# Patient Record
Sex: MALE | Race: WHITE | NOT HISPANIC OR LATINO
[De-identification: names, ages, dates, MRNs, and addresses within clinical notes are randomized per-mention and may not be internally consistent; named-entity substitution may affect disease eponyms.]

---

## 2017-02-07 ENCOUNTER — APPOINTMENT (OUTPATIENT)
Dept: UROLOGY | Facility: CLINIC | Age: 62
End: 2017-02-07

## 2017-02-07 ENCOUNTER — OUTPATIENT (OUTPATIENT)
Dept: OUTPATIENT SERVICES | Facility: HOSPITAL | Age: 62
LOS: 1 days | End: 2017-02-07
Payer: COMMERCIAL

## 2017-02-07 VITALS
BODY MASS INDEX: 25.11 KG/M2 | WEIGHT: 160 LBS | DIASTOLIC BLOOD PRESSURE: 78 MMHG | HEIGHT: 67 IN | HEART RATE: 83 BPM | SYSTOLIC BLOOD PRESSURE: 128 MMHG | RESPIRATION RATE: 17 BRPM | TEMPERATURE: 98 F

## 2017-02-07 DIAGNOSIS — Z93.6 OTHER ARTIFICIAL OPENINGS OF URINARY TRACT STATUS: Chronic | ICD-10-CM

## 2017-02-07 DIAGNOSIS — R35.0 FREQUENCY OF MICTURITION: ICD-10-CM

## 2017-02-07 DIAGNOSIS — Z98.89 OTHER SPECIFIED POSTPROCEDURAL STATES: Chronic | ICD-10-CM

## 2017-02-07 LAB
PSA FREE FLD-MCNC: 14.6 %
PSA FREE SERPL-MCNC: 0.91 NG/ML
PSA SERPL-MCNC: 6.25 NG/ML

## 2017-02-07 PROCEDURE — 76775 US EXAM ABDO BACK WALL LIM: CPT

## 2017-02-16 DIAGNOSIS — R97.20 ELEVATED PROSTATE SPECIFIC ANTIGEN [PSA]: ICD-10-CM

## 2017-02-16 DIAGNOSIS — N20.0 CALCULUS OF KIDNEY: ICD-10-CM

## 2017-03-30 LAB
PSA FREE FLD-MCNC: 14.6 %
PSA FREE SERPL-MCNC: 0.52 NG/ML
PSA SERPL-MCNC: 3.57 NG/ML

## 2017-12-04 ENCOUNTER — TRANSCRIPTION ENCOUNTER (OUTPATIENT)
Age: 62
End: 2017-12-04

## 2018-02-01 ENCOUNTER — OUTPATIENT (OUTPATIENT)
Dept: OUTPATIENT SERVICES | Facility: HOSPITAL | Age: 63
LOS: 1 days | End: 2018-02-01
Payer: COMMERCIAL

## 2018-02-01 ENCOUNTER — APPOINTMENT (OUTPATIENT)
Dept: UROLOGY | Facility: CLINIC | Age: 63
End: 2018-02-01
Payer: COMMERCIAL

## 2018-02-01 VITALS
BODY MASS INDEX: 26.68 KG/M2 | DIASTOLIC BLOOD PRESSURE: 81 MMHG | TEMPERATURE: 98 F | RESPIRATION RATE: 17 BRPM | HEIGHT: 67 IN | HEART RATE: 83 BPM | SYSTOLIC BLOOD PRESSURE: 133 MMHG | WEIGHT: 170 LBS

## 2018-02-01 DIAGNOSIS — N28.89 OTHER SPECIFIED DISORDERS OF KIDNEY AND URETER: ICD-10-CM

## 2018-02-01 DIAGNOSIS — R35.0 FREQUENCY OF MICTURITION: ICD-10-CM

## 2018-02-01 DIAGNOSIS — Z98.89 OTHER SPECIFIED POSTPROCEDURAL STATES: Chronic | ICD-10-CM

## 2018-02-01 DIAGNOSIS — Z93.6 OTHER ARTIFICIAL OPENINGS OF URINARY TRACT STATUS: Chronic | ICD-10-CM

## 2018-02-01 LAB
PSA FREE FLD-MCNC: 17
PSA FREE SERPL-MCNC: 0.73 NG/ML
PSA SERPL-MCNC: 4.3 NG/ML

## 2018-02-01 PROCEDURE — 76775 US EXAM ABDO BACK WALL LIM: CPT | Mod: 26

## 2018-02-01 PROCEDURE — 99213 OFFICE O/P EST LOW 20 MIN: CPT | Mod: 25

## 2018-02-01 PROCEDURE — 76775 US EXAM ABDO BACK WALL LIM: CPT

## 2018-02-14 DIAGNOSIS — N28.1 CYST OF KIDNEY, ACQUIRED: ICD-10-CM

## 2018-02-14 DIAGNOSIS — R97.20 ELEVATED PROSTATE SPECIFIC ANTIGEN [PSA]: ICD-10-CM

## 2018-02-14 DIAGNOSIS — N20.0 CALCULUS OF KIDNEY: ICD-10-CM

## 2018-08-30 ENCOUNTER — APPOINTMENT (OUTPATIENT)
Dept: UROLOGY | Facility: CLINIC | Age: 63
End: 2018-08-30
Payer: COMMERCIAL

## 2018-08-30 ENCOUNTER — OUTPATIENT (OUTPATIENT)
Dept: OUTPATIENT SERVICES | Facility: HOSPITAL | Age: 63
LOS: 1 days | End: 2018-08-30
Payer: COMMERCIAL

## 2018-08-30 VITALS
RESPIRATION RATE: 17 BRPM | BODY MASS INDEX: 27 KG/M2 | DIASTOLIC BLOOD PRESSURE: 84 MMHG | TEMPERATURE: 97.9 F | WEIGHT: 172 LBS | SYSTOLIC BLOOD PRESSURE: 123 MMHG | HEART RATE: 89 BPM | HEIGHT: 67 IN

## 2018-08-30 DIAGNOSIS — Z98.89 OTHER SPECIFIED POSTPROCEDURAL STATES: Chronic | ICD-10-CM

## 2018-08-30 DIAGNOSIS — Z93.6 OTHER ARTIFICIAL OPENINGS OF URINARY TRACT STATUS: Chronic | ICD-10-CM

## 2018-08-30 DIAGNOSIS — R35.0 FREQUENCY OF MICTURITION: ICD-10-CM

## 2018-08-30 LAB — PSA SERPL-MCNC: 2.5

## 2018-08-30 PROCEDURE — 76775 US EXAM ABDO BACK WALL LIM: CPT

## 2018-08-30 PROCEDURE — 99213 OFFICE O/P EST LOW 20 MIN: CPT | Mod: 25

## 2018-08-30 PROCEDURE — 76775 US EXAM ABDO BACK WALL LIM: CPT | Mod: 26

## 2018-09-12 DIAGNOSIS — R97.20 ELEVATED PROSTATE SPECIFIC ANTIGEN [PSA]: ICD-10-CM

## 2018-09-12 DIAGNOSIS — N20.0 CALCULUS OF KIDNEY: ICD-10-CM

## 2018-10-04 ENCOUNTER — APPOINTMENT (OUTPATIENT)
Dept: UROLOGY | Facility: CLINIC | Age: 63
End: 2018-10-04

## 2019-05-16 ENCOUNTER — OUTPATIENT (OUTPATIENT)
Dept: OUTPATIENT SERVICES | Facility: HOSPITAL | Age: 64
LOS: 1 days | End: 2019-05-16
Payer: COMMERCIAL

## 2019-05-16 ENCOUNTER — APPOINTMENT (OUTPATIENT)
Dept: UROLOGY | Facility: CLINIC | Age: 64
End: 2019-05-16
Payer: COMMERCIAL

## 2019-05-16 DIAGNOSIS — R97.20 ELEVATED PROSTATE SPECIFIC ANTIGEN [PSA]: ICD-10-CM

## 2019-05-16 DIAGNOSIS — R35.0 FREQUENCY OF MICTURITION: ICD-10-CM

## 2019-05-16 DIAGNOSIS — N20.0 CALCULUS OF KIDNEY: ICD-10-CM

## 2019-05-16 DIAGNOSIS — Z93.6 OTHER ARTIFICIAL OPENINGS OF URINARY TRACT STATUS: Chronic | ICD-10-CM

## 2019-05-16 DIAGNOSIS — Z98.89 OTHER SPECIFIED POSTPROCEDURAL STATES: Chronic | ICD-10-CM

## 2019-05-16 LAB — PSA SERPL-MCNC: 4.24 NG/ML

## 2019-05-16 PROCEDURE — 99213 OFFICE O/P EST LOW 20 MIN: CPT | Mod: 25

## 2019-05-16 PROCEDURE — 76775 US EXAM ABDO BACK WALL LIM: CPT | Mod: 26

## 2019-05-16 PROCEDURE — 76775 US EXAM ABDO BACK WALL LIM: CPT

## 2019-06-03 NOTE — ASSESSMENT
[FreeTextEntry1] : Renal US: no stones, no hydronephrosis. Stable complex renal cyst.\par PSA stable.\par \par Has mild BPH symptoms but not bothersome enough for medication.\par Follow up in one year for repeat Renal US.

## 2019-06-03 NOTE — HISTORY OF PRESENT ILLNESS
[FreeTextEntry1] : Here for 8 month follow up.\par Renal US today.\par \par No new urinary complaints.\par No abdominal or flank pain.\par \par Med/allergy reviewed.

## 2019-06-03 NOTE — PHYSICAL EXAM
[General Appearance - Well Nourished] : well nourished [Normal Appearance] : normal appearance [General Appearance - Well Developed] : well developed [Well Groomed] : well groomed [Abdomen Soft] : soft [General Appearance - In No Acute Distress] : no acute distress [Abdomen Tenderness] : non-tender [Urinary Bladder Findings] : the bladder was normal on palpation [Costovertebral Angle Tenderness] : no ~M costovertebral angle tenderness

## 2019-08-18 ENCOUNTER — EMERGENCY (EMERGENCY)
Facility: HOSPITAL | Age: 64
LOS: 0 days | Discharge: HOME | End: 2019-08-18
Attending: EMERGENCY MEDICINE
Payer: COMMERCIAL

## 2019-08-18 VITALS
SYSTOLIC BLOOD PRESSURE: 114 MMHG | DIASTOLIC BLOOD PRESSURE: 74 MMHG | RESPIRATION RATE: 18 BRPM | OXYGEN SATURATION: 100 % | TEMPERATURE: 98 F | HEART RATE: 94 BPM

## 2019-08-18 VITALS — WEIGHT: 173.94 LBS | HEIGHT: 67 IN

## 2019-08-18 DIAGNOSIS — Z87.442 PERSONAL HISTORY OF URINARY CALCULI: ICD-10-CM

## 2019-08-18 DIAGNOSIS — Z93.6 OTHER ARTIFICIAL OPENINGS OF URINARY TRACT STATUS: Chronic | ICD-10-CM
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
## 2019-08-18 DIAGNOSIS — Z98.89 OTHER SPECIFIED POSTPROCEDURAL STATES: Chronic | ICD-10-CM

## 2019-08-18 DIAGNOSIS — I10 ESSENTIAL (PRIMARY) HYPERTENSION: ICD-10-CM

## 2019-08-18 DIAGNOSIS — S61.215A LACERATION WITHOUT FOREIGN BODY OF LEFT RING FINGER WITHOUT DAMAGE TO NAIL, INITIAL ENCOUNTER: ICD-10-CM

## 2019-08-18 DIAGNOSIS — Z23 ENCOUNTER FOR IMMUNIZATION: ICD-10-CM
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
## 2019-08-18 PROCEDURE — 73140 X-RAY EXAM OF FINGER(S): CPT | Mod: 26,LT

## 2019-08-18 PROCEDURE — 12001 RPR S/N/AX/GEN/TRNK 2.5CM/<: CPT

## 2019-08-18 PROCEDURE — 90471 IMMUNIZATION ADMIN: CPT

## 2019-08-18 PROCEDURE — 99284 EMERGENCY DEPT VISIT MOD MDM: CPT

## 2019-08-18 PROCEDURE — 99283 EMERGENCY DEPT VISIT LOW MDM: CPT | Mod: 25

## 2019-08-18 PROCEDURE — 90715 TDAP VACCINE 7 YRS/> IM: CPT

## 2019-08-18 PROCEDURE — 73140 X-RAY EXAM OF FINGER(S): CPT | Mod: LT

## 2019-08-18 RX ORDER — TETANUS TOXOID, REDUCED DIPHTHERIA TOXOID AND ACELLULAR PERTUSSIS VACCINE, ADSORBED 5; 2.5; 8; 8; 2.5 [IU]/.5ML; [IU]/.5ML; UG/.5ML; UG/.5ML; UG/.5ML
0.5 SUSPENSION INTRAMUSCULAR ONCE
Refills: 0 | Status: COMPLETED | OUTPATIENT
Start: 2019-08-18 | End: 2019-08-18

## 2019-08-18 RX ADMIN — TETANUS TOXOID, REDUCED DIPHTHERIA TOXOID AND ACELLULAR PERTUSSIS VACCINE, ADSORBED 0.5 MILLILITER(S): 5; 2.5; 8; 8; 2.5 SUSPENSION INTRAMUSCULAR at 19:50

## 2019-08-18 NOTE — ED STATDOCS - PROGRESS NOTE DETAILS
Patient seen and evaluated with ED attending at intake.  dermabond used to repair superficial wound.  He will follow up with PMD -Kee Lo PA-C

## 2019-08-18 NOTE — ED STATDOCS - NSFOLLOWUPINSTRUCTIONS_ED_ALL_ED_FT
Tissue Adhesive Wound Care  Some cuts and wounds can be closed with skin glue (tissue adhesive). Skin glue holds the skin together and helps your wound heal faster. Skin glue goes away on its own as your wound gets better.    Follow these instructions at home:  Wound care     Image   Showers are allowed 24 hours after treatment. Do not soak the wound in water. Do not take baths, swim, or use hot tubs. Do not use soaps or creams on your wound.  If a bandage (dressing) was put on the wound:    Wash your hands with soap and water before you change your bandage.  Change the bandage as often as told by your doctor.  Leave skin glue in place. It will fall off on its own after 7–10 days.  Keep the bandage dry.    Do not scratch, rub, or pick at the skin glue.  Do not put tape over the skin glue. The skin glue could come off when you take the tape off.  Protect the wound from another injury.  Protect the wound from sun and tanning beds.  General instructions     Take over-the-counter and prescription medicines only as told by your doctor.  Keep all follow-up visits as told by your doctor. This is important.  Get help right away if:  Your wound is red, puffy (swollen), hot, or tender.  You get a rash after the glue is put on.  You have more pain in the wound.  You have a red streak going away from the wound.  You have yellowish-white fluid (pus) coming from the wound.  You have more bleeding.  You have a fever.  You have chills and you start to shake.  You notice a bad smell coming from the wound.  Your wound or skin glue breaks open.  This information is not intended to replace advice given to you by your health care provider. Make sure you discuss any questions you have with your health care provider.

## 2019-08-18 NOTE — ED STATDOCS - OBJECTIVE STATEMENT
65 y/o male with PMHx of HTN presents to the ED c/o L 4th digit laceration with assoc. pain sustained 1hr PTA. Bleeding controlled PTA. Pt states that he accidentally cut finger when using knife. Tetanus not UTD. 63 y/o male with PMHx of HTN presents to the ED c/o L 4th digit laceration with assoc. pain sustained 1hr PTA. Bleeding controlled PTA. Pt states that he accidentally cut finger when using knife. Tetanus not UTD. No other acute complaints in ED at this time.

## 2020-02-09 ENCOUNTER — LABORATORY RESULT (OUTPATIENT)
Age: 65
End: 2020-02-09

## 2020-02-13 ENCOUNTER — OUTPATIENT (OUTPATIENT)
Dept: OUTPATIENT SERVICES | Facility: HOSPITAL | Age: 65
LOS: 1 days | End: 2020-02-13
Payer: COMMERCIAL

## 2020-02-13 ENCOUNTER — APPOINTMENT (OUTPATIENT)
Dept: UROLOGY | Facility: CLINIC | Age: 65
End: 2020-02-13
Payer: COMMERCIAL

## 2020-02-13 DIAGNOSIS — Z98.89 OTHER SPECIFIED POSTPROCEDURAL STATES: Chronic | ICD-10-CM

## 2020-02-13 DIAGNOSIS — Z93.6 OTHER ARTIFICIAL OPENINGS OF URINARY TRACT STATUS: Chronic | ICD-10-CM

## 2020-02-13 DIAGNOSIS — R35.0 FREQUENCY OF MICTURITION: ICD-10-CM

## 2020-02-13 PROCEDURE — 99213 OFFICE O/P EST LOW 20 MIN: CPT | Mod: 25

## 2020-02-13 PROCEDURE — 76775 US EXAM ABDO BACK WALL LIM: CPT

## 2020-02-13 PROCEDURE — 76775 US EXAM ABDO BACK WALL LIM: CPT | Mod: 26

## 2020-02-13 NOTE — HISTORY OF PRESENT ILLNESS
[FreeTextEntry1] : Here for follow up visit.\par BPH: stream is weaker but no significant frequency, urgency. Nocturia 2x.  Symptoms not bothersome.\par \par No hematuria, dysuria or incontinence.\par \par No renal colic or stone passage.\par \par Office US today:  complex cyst stable in size, non vascular.  Right kidney hyperechoic lesion consistent with AML increased in size from 1.8 to 2.6 cm.

## 2020-02-13 NOTE — ASSESSMENT
[FreeTextEntry1] : BPH: no medication at this time.\par Renal AML:  follow up in 6 months for repeat US.\par Labs reviewed.

## 2020-02-13 NOTE — PHYSICAL EXAM
[General Appearance - Well Developed] : well developed [General Appearance - Well Nourished] : well nourished [Normal Appearance] : normal appearance [Abdomen Soft] : soft [Well Groomed] : well groomed [General Appearance - In No Acute Distress] : no acute distress [Abdomen Tenderness] : non-tender [Costovertebral Angle Tenderness] : no ~M costovertebral angle tenderness [Urinary Bladder Findings] : the bladder was normal on palpation

## 2020-02-24 DIAGNOSIS — N20.0 CALCULUS OF KIDNEY: ICD-10-CM

## 2020-02-24 DIAGNOSIS — R97.20 ELEVATED PROSTATE SPECIFIC ANTIGEN [PSA]: ICD-10-CM

## 2020-09-08 ENCOUNTER — APPOINTMENT (OUTPATIENT)
Dept: UROLOGY | Facility: CLINIC | Age: 65
End: 2020-09-08
Payer: COMMERCIAL

## 2020-09-08 ENCOUNTER — OUTPATIENT (OUTPATIENT)
Dept: OUTPATIENT SERVICES | Facility: HOSPITAL | Age: 65
LOS: 1 days | End: 2020-09-08
Payer: COMMERCIAL

## 2020-09-08 VITALS
BODY MASS INDEX: 27 KG/M2 | SYSTOLIC BLOOD PRESSURE: 114 MMHG | WEIGHT: 172 LBS | DIASTOLIC BLOOD PRESSURE: 73 MMHG | RESPIRATION RATE: 16 BRPM | HEART RATE: 69 BPM | HEIGHT: 67 IN

## 2020-09-08 VITALS — TEMPERATURE: 98 F

## 2020-09-08 DIAGNOSIS — Z93.6 OTHER ARTIFICIAL OPENINGS OF URINARY TRACT STATUS: Chronic | ICD-10-CM

## 2020-09-08 DIAGNOSIS — Z98.89 OTHER SPECIFIED POSTPROCEDURAL STATES: Chronic | ICD-10-CM

## 2020-09-08 DIAGNOSIS — R35.0 FREQUENCY OF MICTURITION: ICD-10-CM

## 2020-09-08 LAB
PSA FREE FLD-MCNC: 14 %
PSA FREE SERPL-MCNC: 0.55 NG/ML
PSA SERPL-MCNC: 3.84 NG/ML

## 2020-09-08 PROCEDURE — 76775 US EXAM ABDO BACK WALL LIM: CPT

## 2020-09-08 PROCEDURE — 99213 OFFICE O/P EST LOW 20 MIN: CPT | Mod: 25

## 2020-09-08 PROCEDURE — 76775 US EXAM ABDO BACK WALL LIM: CPT | Mod: 26

## 2020-09-10 ENCOUNTER — APPOINTMENT (OUTPATIENT)
Dept: UROLOGY | Facility: CLINIC | Age: 65
End: 2020-09-10

## 2020-09-11 NOTE — HISTORY OF PRESENT ILLNESS
[FreeTextEntry1] : Here for follow up visit.\par BPH: stream is mild weak but no significant frequency, urgency. Nocturia 2x.  \par Symptoms not bothersome, unchanged.\par \par No hematuria, dysuria or incontinence.\par \par No renal colic or stone passage.\par \par Office US today:  complex cyst stable in size, non vascular.  Right kidney hyperechoic lesion consistent with AML stable in size, 2.2 cm.  No stones.

## 2020-09-11 NOTE — ASSESSMENT
[FreeTextEntry1] : Doing well. PSA results reviewed, stable.\par US stable.\par Follow up in one year.

## 2020-09-11 NOTE — PHYSICAL EXAM
[General Appearance - Well Developed] : well developed [General Appearance - Well Nourished] : well nourished [Well Groomed] : well groomed [Normal Appearance] : normal appearance [General Appearance - In No Acute Distress] : no acute distress [Costovertebral Angle Tenderness] : no ~M costovertebral angle tenderness [Abdomen Soft] : soft [Abdomen Tenderness] : non-tender [Urinary Bladder Findings] : the bladder was normal on palpation

## 2021-02-22 ENCOUNTER — APPOINTMENT (OUTPATIENT)
Dept: UROLOGY | Facility: CLINIC | Age: 66
End: 2021-02-22
Payer: COMMERCIAL

## 2021-02-22 PROCEDURE — 99072 ADDL SUPL MATRL&STAF TM PHE: CPT

## 2021-02-22 PROCEDURE — 99213 OFFICE O/P EST LOW 20 MIN: CPT

## 2021-02-22 RX ORDER — CHOLECALCIFEROL (VITAMIN D3) 1250 MCG
1.25 MG CAPSULE ORAL
Qty: 12 | Refills: 0 | Status: ACTIVE | COMMUNITY
Start: 2021-02-13

## 2021-03-05 ENCOUNTER — NON-APPOINTMENT (OUTPATIENT)
Age: 66
End: 2021-03-05

## 2021-03-08 LAB
PSA FREE FLD-MCNC: 16 %
PSA FREE SERPL-MCNC: 0.65 NG/ML
PSA SERPL-MCNC: 4.14 NG/ML

## 2021-03-09 NOTE — HISTORY OF PRESENT ILLNESS
[FreeTextEntry1] : Here for follow up visit.\par BPH: stream is mild weak but no significant frequency, urgency. \par Nocturia 1-2x per night.  \par Symptoms not bothersome, unchanged.\par \par No hematuria, dysuria or incontinence.\par \par No flank pain.\par \par Last renal US 9/8/2020 :  complex cyst stable in size, non vascular.  Right kidney hyperechoic lesion consistent with AML stable in size, 2.2 cm.  No stones.

## 2021-03-09 NOTE — ASSESSMENT
[FreeTextEntry1] : Labs reviewed. \par Recent PSA 4.5 ng/mL, 14.9% free.  Prior PSA here was 3.8 ng/mL.\par Repeat PSA f/t today.\par If same or higher, will consider MRI Prostate.\par If back to baseline, then will follow up in September 2021 for Renal US.\par \par All questions answered.

## 2021-03-09 NOTE — PHYSICAL EXAM
[General Appearance - Well Developed] : well developed [General Appearance - Well Nourished] : well nourished [Normal Appearance] : normal appearance [Well Groomed] : well groomed [General Appearance - In No Acute Distress] : no acute distress [Abdomen Soft] : soft [Abdomen Tenderness] : non-tender [Costovertebral Angle Tenderness] : no ~M costovertebral angle tenderness [No Prostate Nodules] : no prostate nodules

## 2021-04-09 ENCOUNTER — APPOINTMENT (OUTPATIENT)
Dept: MRI IMAGING | Facility: IMAGING CENTER | Age: 66
End: 2021-04-09
Payer: COMMERCIAL

## 2021-04-09 ENCOUNTER — OUTPATIENT (OUTPATIENT)
Dept: OUTPATIENT SERVICES | Facility: HOSPITAL | Age: 66
LOS: 1 days | End: 2021-04-09
Payer: COMMERCIAL

## 2021-04-09 ENCOUNTER — RESULT REVIEW (OUTPATIENT)
Age: 66
End: 2021-04-09

## 2021-04-09 DIAGNOSIS — Z98.89 OTHER SPECIFIED POSTPROCEDURAL STATES: Chronic | ICD-10-CM

## 2021-04-09 DIAGNOSIS — Z93.6 OTHER ARTIFICIAL OPENINGS OF URINARY TRACT STATUS: Chronic | ICD-10-CM

## 2021-04-09 DIAGNOSIS — Z00.8 ENCOUNTER FOR OTHER GENERAL EXAMINATION: ICD-10-CM

## 2021-04-09 DIAGNOSIS — R97.20 ELEVATED PROSTATE SPECIFIC ANTIGEN [PSA]: ICD-10-CM

## 2021-04-09 PROCEDURE — 76377 3D RENDER W/INTRP POSTPROCES: CPT

## 2021-04-09 PROCEDURE — 72197 MRI PELVIS W/O & W/DYE: CPT | Mod: 26

## 2021-04-09 PROCEDURE — 72197 MRI PELVIS W/O & W/DYE: CPT

## 2021-04-09 PROCEDURE — 76377 3D RENDER W/INTRP POSTPROCES: CPT | Mod: 26

## 2021-04-09 PROCEDURE — A9585: CPT

## 2021-04-14 ENCOUNTER — NON-APPOINTMENT (OUTPATIENT)
Age: 66
End: 2021-04-14

## 2021-04-20 ENCOUNTER — APPOINTMENT (OUTPATIENT)
Dept: UROLOGY | Facility: CLINIC | Age: 66
End: 2021-04-20
Payer: COMMERCIAL

## 2021-04-20 ENCOUNTER — OUTPATIENT (OUTPATIENT)
Dept: OUTPATIENT SERVICES | Facility: HOSPITAL | Age: 66
LOS: 1 days | End: 2021-04-20
Payer: COMMERCIAL

## 2021-04-20 VITALS — DIASTOLIC BLOOD PRESSURE: 72 MMHG | HEART RATE: 92 BPM | SYSTOLIC BLOOD PRESSURE: 116 MMHG

## 2021-04-20 VITALS — SYSTOLIC BLOOD PRESSURE: 99 MMHG | HEART RATE: 79 BPM | DIASTOLIC BLOOD PRESSURE: 65 MMHG

## 2021-04-20 VITALS — DIASTOLIC BLOOD PRESSURE: 32 MMHG | SYSTOLIC BLOOD PRESSURE: 66 MMHG

## 2021-04-20 VITALS — SYSTOLIC BLOOD PRESSURE: 128 MMHG | HEART RATE: 84 BPM | TEMPERATURE: 97 F | DIASTOLIC BLOOD PRESSURE: 79 MMHG

## 2021-04-20 VITALS — SYSTOLIC BLOOD PRESSURE: 95 MMHG | DIASTOLIC BLOOD PRESSURE: 61 MMHG

## 2021-04-20 VITALS — SYSTOLIC BLOOD PRESSURE: 106 MMHG | DIASTOLIC BLOOD PRESSURE: 73 MMHG | HEART RATE: 90 BPM

## 2021-04-20 DIAGNOSIS — Z98.89 OTHER SPECIFIED POSTPROCEDURAL STATES: Chronic | ICD-10-CM

## 2021-04-20 DIAGNOSIS — R97.20 ELEVATED PROSTATE SPECIFIC ANTIGEN [PSA]: ICD-10-CM

## 2021-04-20 DIAGNOSIS — R35.0 FREQUENCY OF MICTURITION: ICD-10-CM

## 2021-04-20 DIAGNOSIS — Z93.6 OTHER ARTIFICIAL OPENINGS OF URINARY TRACT STATUS: Chronic | ICD-10-CM

## 2021-04-20 PROCEDURE — 76872 US TRANSRECTAL: CPT

## 2021-04-20 PROCEDURE — 55700: CPT | Mod: 22

## 2021-04-20 PROCEDURE — 76942 ECHO GUIDE FOR BIOPSY: CPT

## 2021-04-20 PROCEDURE — 76942 ECHO GUIDE FOR BIOPSY: CPT | Mod: 26,59

## 2021-04-20 PROCEDURE — 76872 US TRANSRECTAL: CPT | Mod: 26

## 2021-04-20 PROCEDURE — 55700: CPT

## 2021-04-20 RX ORDER — DIAZEPAM 5 MG/1
5 TABLET ORAL
Qty: 1 | Refills: 0 | Status: COMPLETED | COMMUNITY
Start: 2021-04-14 | End: 2021-04-20

## 2021-04-21 ENCOUNTER — NON-APPOINTMENT (OUTPATIENT)
Age: 66
End: 2021-04-21

## 2021-04-27 DIAGNOSIS — R97.20 ELEVATED PROSTATE, SPECIFIC ANTIGEN [PSA]: ICD-10-CM

## 2021-04-27 LAB — CORE LAB BIOPSY: NORMAL

## 2021-05-04 ENCOUNTER — NON-APPOINTMENT (OUTPATIENT)
Age: 66
End: 2021-05-04

## 2021-06-08 ENCOUNTER — NON-APPOINTMENT (OUTPATIENT)
Age: 66
End: 2021-06-08

## 2021-07-20 ENCOUNTER — APPOINTMENT (OUTPATIENT)
Dept: UROLOGY | Facility: CLINIC | Age: 66
End: 2021-07-20
Payer: COMMERCIAL

## 2021-07-20 DIAGNOSIS — Z85.89 PERSONAL HISTORY OF MALIGNANT NEOPLASM OF OTHER ORGANS AND SYSTEMS: ICD-10-CM

## 2021-07-20 LAB
PSA FREE FLD-MCNC: 15 %
PSA FREE SERPL-MCNC: 0.74 NG/ML
PSA SERPL-MCNC: 4.85 NG/ML

## 2021-07-20 PROCEDURE — 99213 OFFICE O/P EST LOW 20 MIN: CPT

## 2021-07-31 NOTE — HISTORY OF PRESENT ILLNESS
[FreeTextEntry1] : Prostate cancer, currently on active surveillance.\par Biopsy done on April 20, 2021: Findings revealed single core Heather 6 prostate cancer.\par \par Since his last visit, doing well.  Urinary function stable.  BPH but symptoms not bothersome.

## 2021-07-31 NOTE — ASSESSMENT
[FreeTextEntry1] : Prostate cancer: Continue active surveillance.  PSA today.\par Follow-up in 6 months with PSA prior to visit.  Will also undergo office renal ultrasound for next visit for evaluation of his complex renal cyst.

## 2022-02-11 LAB
PSA FREE FLD-MCNC: 15 %
PSA FREE FLD-MCNC: 18 %
PSA FREE SERPL-MCNC: 0.83 NG/ML
PSA FREE SERPL-MCNC: 2.35 NG/ML
PSA SERPL-MCNC: 13.2 NG/ML
PSA SERPL-MCNC: 5.65 NG/ML

## 2022-02-15 ENCOUNTER — APPOINTMENT (OUTPATIENT)
Dept: UROLOGY | Facility: CLINIC | Age: 67
End: 2022-02-15
Payer: COMMERCIAL

## 2022-02-15 ENCOUNTER — OUTPATIENT (OUTPATIENT)
Dept: OUTPATIENT SERVICES | Facility: HOSPITAL | Age: 67
LOS: 1 days | End: 2022-02-15
Payer: COMMERCIAL

## 2022-02-15 DIAGNOSIS — Z93.6 OTHER ARTIFICIAL OPENINGS OF URINARY TRACT STATUS: Chronic | ICD-10-CM

## 2022-02-15 DIAGNOSIS — N28.1 CYST OF KIDNEY, ACQUIRED: ICD-10-CM

## 2022-02-15 DIAGNOSIS — R35.0 FREQUENCY OF MICTURITION: ICD-10-CM

## 2022-02-15 DIAGNOSIS — D41.01 NEOPLASM OF UNCERTAIN BEHAVIOR OF RIGHT KIDNEY: ICD-10-CM

## 2022-02-15 DIAGNOSIS — Z98.89 OTHER SPECIFIED POSTPROCEDURAL STATES: Chronic | ICD-10-CM

## 2022-02-15 DIAGNOSIS — Z87.898 PERSONAL HISTORY OF OTHER SPECIFIED CONDITIONS: ICD-10-CM

## 2022-02-15 PROCEDURE — 99214 OFFICE O/P EST MOD 30 MIN: CPT

## 2022-02-15 PROCEDURE — 76775 US EXAM ABDO BACK WALL LIM: CPT

## 2022-02-24 ENCOUNTER — OUTPATIENT (OUTPATIENT)
Dept: OUTPATIENT SERVICES | Facility: HOSPITAL | Age: 67
LOS: 1 days | End: 2022-02-24
Payer: COMMERCIAL

## 2022-02-24 ENCOUNTER — APPOINTMENT (OUTPATIENT)
Dept: CT IMAGING | Facility: CLINIC | Age: 67
End: 2022-02-24
Payer: COMMERCIAL

## 2022-02-24 DIAGNOSIS — Z98.89 OTHER SPECIFIED POSTPROCEDURAL STATES: Chronic | ICD-10-CM

## 2022-02-24 DIAGNOSIS — D41.01 NEOPLASM OF UNCERTAIN BEHAVIOR OF RIGHT KIDNEY: ICD-10-CM

## 2022-02-24 DIAGNOSIS — Z93.6 OTHER ARTIFICIAL OPENINGS OF URINARY TRACT STATUS: Chronic | ICD-10-CM

## 2022-02-24 PROCEDURE — 82565 ASSAY OF CREATININE: CPT

## 2022-02-24 PROCEDURE — 74170 CT ABD WO CNTRST FLWD CNTRST: CPT

## 2022-02-24 PROCEDURE — 74170 CT ABD WO CNTRST FLWD CNTRST: CPT | Mod: 26

## 2022-02-27 NOTE — ASSESSMENT
[FreeTextEntry1] : Prostate cancer: Continue on active surveillance.  PSA remains stable.\par Right renal lesion: Ultrasound demonstrates increase in size.  Recommend CT abdomen with and without IV contrast for further evaluation.  Will call with results.\par \par

## 2022-02-27 NOTE — HISTORY OF PRESENT ILLNESS
[FreeTextEntry1] : Prostate cancer: Was diagnosed with low volume, low risk prostate cancer April 2021.  Currently on active surveillance.  Recent PSA 5.65 ng/mL, decreased from 13.2 ng/mL in December 2021.  Last MRI April 2021: 35 mL volume, PI-RADS 3 lesion.\par \par Right lower pole renal lesion: Underwent office renal ultrasound today.  Images were reviewed with patient.  Findings show increase in size of the lesion to 3.1 cm from previous 2.2 cm.  Patient denies gross hematuria, flank pain.\par \par

## 2022-06-20 ENCOUNTER — NON-APPOINTMENT (OUTPATIENT)
Age: 67
End: 2022-06-20

## 2022-10-06 ENCOUNTER — APPOINTMENT (OUTPATIENT)
Dept: UROLOGY | Facility: CLINIC | Age: 67
End: 2022-10-06

## 2022-10-06 ENCOUNTER — OUTPATIENT (OUTPATIENT)
Dept: OUTPATIENT SERVICES | Facility: HOSPITAL | Age: 67
LOS: 1 days | End: 2022-10-06
Payer: COMMERCIAL

## 2022-10-06 VITALS
HEIGHT: 67 IN | DIASTOLIC BLOOD PRESSURE: 70 MMHG | HEART RATE: 80 BPM | SYSTOLIC BLOOD PRESSURE: 102 MMHG | RESPIRATION RATE: 16 BRPM | WEIGHT: 172 LBS | BODY MASS INDEX: 27 KG/M2

## 2022-10-06 DIAGNOSIS — Z98.89 OTHER SPECIFIED POSTPROCEDURAL STATES: Chronic | ICD-10-CM

## 2022-10-06 DIAGNOSIS — R35.0 FREQUENCY OF MICTURITION: ICD-10-CM

## 2022-10-06 DIAGNOSIS — Z93.6 OTHER ARTIFICIAL OPENINGS OF URINARY TRACT STATUS: Chronic | ICD-10-CM

## 2022-10-06 PROCEDURE — 76775 US EXAM ABDO BACK WALL LIM: CPT | Mod: 26

## 2022-10-06 PROCEDURE — 99213 OFFICE O/P EST LOW 20 MIN: CPT

## 2022-10-06 PROCEDURE — 76775 US EXAM ABDO BACK WALL LIM: CPT

## 2022-10-06 NOTE — ASSESSMENT
[FreeTextEntry1] : Bilateral renal cysts: US images reviewed, compared to prior CT a/p.  Stable.  Repeat US in one year.\par Prostate cancer:  PSA results reviewed.  Follow up in 6 months with repeat PSA.  Plan for Prostate MRI and possible surveillance biopsy following next visit.

## 2022-10-06 NOTE — HISTORY OF PRESENT ILLNESS
[FreeTextEntry1] : Prostate cancer: Was diagnosed with low volume, low risk prostate cancer April 20, 2021. Biopsy: 1/13 cores, Heather 6.  Currently on active surveillance. \par 9/29/2022 PSA 4.89 ng/mL, 13%free\par 2/10/2022 PSA 5.65 ng/mL, 15% free\par 12/23/2021 PSA 13.2 ng/mL, 18% free\par \par Last MRI April 2021: 35 mL volume, PI-RADS 3 lesion.\par \par Right lower pole renal lesion: Underwent office renal ultrasound today.  Images were reviewed with patient.\par Renal cysts appear to be stable.  Prior CT a/p w/wo IV contrast Feb 2022: no solid lesion, bilateral renal cysts.\par \par No other new complaints.\par Urinary function stable.

## 2022-10-07 DIAGNOSIS — D41.01 NEOPLASM OF UNCERTAIN BEHAVIOR OF RIGHT KIDNEY: ICD-10-CM

## 2022-10-07 DIAGNOSIS — C61 MALIGNANT NEOPLASM OF PROSTATE: ICD-10-CM

## 2022-10-07 DIAGNOSIS — N40.1 BENIGN PROSTATIC HYPERPLASIA WITH LOWER URINARY TRACT SYMPTOMS: ICD-10-CM

## 2022-10-28 LAB
PSA FREE FLD-MCNC: 13 %
PSA FREE SERPL-MCNC: 0.66 NG/ML
PSA SERPL-MCNC: 4.89 NG/ML

## 2023-05-04 ENCOUNTER — OUTPATIENT (OUTPATIENT)
Dept: OUTPATIENT SERVICES | Facility: HOSPITAL | Age: 68
LOS: 1 days | End: 2023-05-04
Payer: COMMERCIAL

## 2023-05-04 ENCOUNTER — APPOINTMENT (OUTPATIENT)
Dept: UROLOGY | Facility: CLINIC | Age: 68
End: 2023-05-04
Payer: COMMERCIAL

## 2023-05-04 DIAGNOSIS — R35.0 FREQUENCY OF MICTURITION: ICD-10-CM

## 2023-05-04 DIAGNOSIS — Z98.89 OTHER SPECIFIED POSTPROCEDURAL STATES: Chronic | ICD-10-CM

## 2023-05-04 DIAGNOSIS — Z93.6 OTHER ARTIFICIAL OPENINGS OF URINARY TRACT STATUS: Chronic | ICD-10-CM

## 2023-05-04 PROCEDURE — 99214 OFFICE O/P EST MOD 30 MIN: CPT

## 2023-05-04 PROCEDURE — 76775 US EXAM ABDO BACK WALL LIM: CPT | Mod: 26

## 2023-05-04 PROCEDURE — 76775 US EXAM ABDO BACK WALL LIM: CPT

## 2023-05-04 RX ORDER — NAPROXEN 500 MG/1
500 TABLET ORAL
Qty: 60 | Refills: 0 | Status: COMPLETED | COMMUNITY
Start: 2020-08-28 | End: 2023-05-04

## 2023-05-04 NOTE — ASSESSMENT
[FreeTextEntry1] : Ultrasound results reviewed.  No evidence of solid renal mass.  Cyst appear to be stable in size.  Images reviewed with the patient.  3 nonobstructing stones in the kidney, no intervention indicated at this time.  Discussed stone prevention strategies including fluid hydration, low-sodium diet.\par \par Prostate cancer: PSA results reviewed.  Recommend MRI prostate with and without IV contrast.  We will call the patient with results.  Following MRI, patient will need to undergo a transperineal prostate biopsy.  If targeted lesion noted on MRI, then fusion biopsy will be performed.  Otherwise standard transperineal template biopsy will be performed.  Patient wishes to have his biopsy done under anesthesia.

## 2023-05-04 NOTE — HISTORY OF PRESENT ILLNESS
[FreeTextEntry1] : Prostate cancer: Was diagnosed with low volume, low risk prostate cancer April 20, 2021. Biopsy: 1/13 cores, Heather 6.  Currently on active surveillance. \par 9/29/2022 PSA 4.89 ng/mL, 13%free\par 2/10/2022 PSA 5.65 ng/mL, 15% free\par 12/23/2021 PSA 13.2 ng/mL, 18% free\par 9/29/2022 PSA 4.89 ng/mL, 13% free\par 4/28/2023 PSA 4.91 ng/mL, 16% free\par \par Last MRI April 2021: 35 mL volume, PI-RADS 3 lesion.\par \par Right lower pole renal lesion: Underwent office renal ultrasound today.  Images were reviewed with patient.\par Findings: \par \par Prior CT a/p w/wo IV contrast Feb 2022: no solid lesion, bilateral renal cysts.\par \par No other new complaints.\par Urinary function stable.\par \par 4 days ago he had right testicular pain and swelling, accompanied with urinary frequency and urgency. he states symptoms have resolved. Denies gross hematuria.

## 2023-05-19 ENCOUNTER — RESULT REVIEW (OUTPATIENT)
Age: 68
End: 2023-05-19

## 2023-05-19 ENCOUNTER — OUTPATIENT (OUTPATIENT)
Dept: OUTPATIENT SERVICES | Facility: HOSPITAL | Age: 68
LOS: 1 days | End: 2023-05-19
Payer: COMMERCIAL

## 2023-05-19 ENCOUNTER — APPOINTMENT (OUTPATIENT)
Dept: MRI IMAGING | Facility: CLINIC | Age: 68
End: 2023-05-19
Payer: COMMERCIAL

## 2023-05-19 DIAGNOSIS — Z98.89 OTHER SPECIFIED POSTPROCEDURAL STATES: Chronic | ICD-10-CM

## 2023-05-19 DIAGNOSIS — Z93.6 OTHER ARTIFICIAL OPENINGS OF URINARY TRACT STATUS: Chronic | ICD-10-CM

## 2023-05-19 DIAGNOSIS — C61 MALIGNANT NEOPLASM OF PROSTATE: ICD-10-CM

## 2023-05-19 DIAGNOSIS — N40.1 BENIGN PROSTATIC HYPERPLASIA WITH LOWER URINARY TRACT SYMPTOMS: ICD-10-CM

## 2023-05-19 DIAGNOSIS — D41.01 NEOPLASM OF UNCERTAIN BEHAVIOR OF RIGHT KIDNEY: ICD-10-CM

## 2023-05-19 PROCEDURE — A9585: CPT

## 2023-05-19 PROCEDURE — 76498P: CUSTOM | Mod: 26

## 2023-05-19 PROCEDURE — 72197 MRI PELVIS W/O & W/DYE: CPT

## 2023-05-19 PROCEDURE — 76498 UNLISTED MR PROCEDURE: CPT

## 2023-05-19 PROCEDURE — 72197 MRI PELVIS W/O & W/DYE: CPT | Mod: 26

## 2023-05-30 LAB
APPEARANCE: CLEAR
BACTERIA UR CULT: NORMAL
BACTERIA: NEGATIVE /HPF
BILIRUBIN URINE: NEGATIVE
BLOOD URINE: NEGATIVE
CAST: 0 /LPF
COLOR: YELLOW
EPITHELIAL CELLS: 0 /HPF
GLUCOSE QUALITATIVE U: NEGATIVE MG/DL
KETONES URINE: NEGATIVE MG/DL
LEUKOCYTE ESTERASE URINE: NEGATIVE
MICROSCOPIC-UA: NORMAL
NITRITE URINE: NEGATIVE
PH URINE: 7.5
PROTEIN URINE: NEGATIVE MG/DL
PSA FREE FLD-MCNC: 16 %
PSA FREE SERPL-MCNC: 0.76 NG/ML
PSA SERPL-MCNC: 4.91 NG/ML
RED BLOOD CELLS URINE: 0 /HPF
SPECIFIC GRAVITY URINE: 1.01
UROBILINOGEN URINE: 0.2 MG/DL
WHITE BLOOD CELLS URINE: 0 /HPF

## 2023-06-30 ENCOUNTER — OUTPATIENT (OUTPATIENT)
Dept: OUTPATIENT SERVICES | Facility: HOSPITAL | Age: 68
LOS: 1 days | End: 2023-06-30
Payer: COMMERCIAL

## 2023-06-30 VITALS
SYSTOLIC BLOOD PRESSURE: 123 MMHG | HEIGHT: 67 IN | HEART RATE: 64 BPM | DIASTOLIC BLOOD PRESSURE: 79 MMHG | TEMPERATURE: 97 F | WEIGHT: 167.99 LBS | RESPIRATION RATE: 16 BRPM | OXYGEN SATURATION: 97 %

## 2023-06-30 DIAGNOSIS — R94.31 ABNORMAL ELECTROCARDIOGRAM [ECG] [EKG]: ICD-10-CM

## 2023-06-30 DIAGNOSIS — Z98.89 OTHER SPECIFIED POSTPROCEDURAL STATES: Chronic | ICD-10-CM

## 2023-06-30 DIAGNOSIS — C61 MALIGNANT NEOPLASM OF PROSTATE: ICD-10-CM

## 2023-06-30 DIAGNOSIS — Z93.6 OTHER ARTIFICIAL OPENINGS OF URINARY TRACT STATUS: Chronic | ICD-10-CM

## 2023-06-30 LAB
A1C WITH ESTIMATED AVERAGE GLUCOSE RESULT: 6.3 % — HIGH (ref 4–5.6)
ANION GAP SERPL CALC-SCNC: 12 MMOL/L — SIGNIFICANT CHANGE UP (ref 7–14)
BUN SERPL-MCNC: 18 MG/DL — SIGNIFICANT CHANGE UP (ref 7–23)
CALCIUM SERPL-MCNC: 10 MG/DL — SIGNIFICANT CHANGE UP (ref 8.4–10.5)
CHLORIDE SERPL-SCNC: 101 MMOL/L — SIGNIFICANT CHANGE UP (ref 98–107)
CO2 SERPL-SCNC: 26 MMOL/L — SIGNIFICANT CHANGE UP (ref 22–31)
CREAT SERPL-MCNC: 0.98 MG/DL — SIGNIFICANT CHANGE UP (ref 0.5–1.3)
EGFR: 85 ML/MIN/1.73M2 — SIGNIFICANT CHANGE UP
ESTIMATED AVERAGE GLUCOSE: 134 — SIGNIFICANT CHANGE UP
GLUCOSE SERPL-MCNC: 94 MG/DL — SIGNIFICANT CHANGE UP (ref 70–99)
HCT VFR BLD CALC: 45.9 % — SIGNIFICANT CHANGE UP (ref 39–50)
HGB BLD-MCNC: 14.6 G/DL — SIGNIFICANT CHANGE UP (ref 13–17)
MCHC RBC-ENTMCNC: 28.6 PG — SIGNIFICANT CHANGE UP (ref 27–34)
MCHC RBC-ENTMCNC: 31.8 GM/DL — LOW (ref 32–36)
MCV RBC AUTO: 89.8 FL — SIGNIFICANT CHANGE UP (ref 80–100)
NRBC # BLD: 0 /100 WBCS — SIGNIFICANT CHANGE UP (ref 0–0)
NRBC # FLD: 0 K/UL — SIGNIFICANT CHANGE UP (ref 0–0)
PLATELET # BLD AUTO: 161 K/UL — SIGNIFICANT CHANGE UP (ref 150–400)
POTASSIUM SERPL-MCNC: 4.3 MMOL/L — SIGNIFICANT CHANGE UP (ref 3.5–5.3)
POTASSIUM SERPL-SCNC: 4.3 MMOL/L — SIGNIFICANT CHANGE UP (ref 3.5–5.3)
RBC # BLD: 5.11 M/UL — SIGNIFICANT CHANGE UP (ref 4.2–5.8)
RBC # FLD: 12.9 % — SIGNIFICANT CHANGE UP (ref 10.3–14.5)
SODIUM SERPL-SCNC: 139 MMOL/L — SIGNIFICANT CHANGE UP (ref 135–145)
WBC # BLD: 5.65 K/UL — SIGNIFICANT CHANGE UP (ref 3.8–10.5)
WBC # FLD AUTO: 5.65 K/UL — SIGNIFICANT CHANGE UP (ref 3.8–10.5)

## 2023-06-30 PROCEDURE — 93010 ELECTROCARDIOGRAM REPORT: CPT

## 2023-06-30 RX ORDER — LISINOPRIL 2.5 MG/1
1 TABLET ORAL
Refills: 0 | DISCHARGE

## 2023-06-30 RX ORDER — ATORVASTATIN CALCIUM 80 MG/1
1 TABLET, FILM COATED ORAL
Refills: 0 | DISCHARGE

## 2023-06-30 RX ORDER — DICLOFENAC SODIUM 75 MG/1
1 TABLET, DELAYED RELEASE ORAL
Refills: 0 | DISCHARGE

## 2023-06-30 RX ORDER — SODIUM CHLORIDE 9 MG/ML
1000 INJECTION, SOLUTION INTRAVENOUS
Refills: 0 | Status: DISCONTINUED | OUTPATIENT
Start: 2023-07-14 | End: 2023-07-28

## 2023-06-30 RX ORDER — VALSARTAN 80 MG/1
1 TABLET ORAL
Refills: 0 | DISCHARGE

## 2023-06-30 NOTE — H&P PST ADULT - MUSCULOSKELETAL
details… no calf tenderness/strength 5/5 bilateral upper extremities/strength 5/5 bilateral lower extremities

## 2023-06-30 NOTE — H&P PST ADULT - MUSCULOSKELETAL COMMENTS
right hip/ leg pain started 3 weeks ago as started on steroid pack which he completed 2 weeks ago.  Tx provided relieft, per pt.

## 2023-06-30 NOTE — H&P PST ADULT - HISTORY OF PRESENT ILLNESS
67 y/o male  with hx for "low grade prostate cancer dx approx 2 years ago. Pt scheduled for Non fusion transperineal prostate biopsy

## 2023-06-30 NOTE — H&P PST ADULT - NSWEIGHTCALCTOOLDRUG_GEN_A_CORE
used Quality 431: Preventive Care And Screening: Unhealthy Alcohol Use - Screening: Patient screened for unhealthy alcohol use using a single question and scores less than 2 times per year Detail Level: Detailed Quality 226: Preventive Care And Screening: Tobacco Use: Screening And Cessation Intervention: Patient screened for tobacco use and is an ex/non-smoker

## 2023-06-30 NOTE — H&P PST ADULT - ASSESSMENT
69 y/o male  with hx for "low grade prostate cancer dx approx 2 years ago. Pt scheduled for Non fusion transperineal prostate biopsy

## 2023-06-30 NOTE — H&P PST ADULT - NSICDXPASTSURGICALHX_GEN_ALL_CORE_FT
PAST SURGICAL HISTORY:  Nephrostomy status rt nephrostomy tube last year    S/P appendectomy     S/P tonsillectomy

## 2023-06-30 NOTE — H&P PST ADULT - PROBLEM SELECTOR PLAN 1
69 y/o male  with hx for "low grade prostate cancer dx approx 2 years ago. Pt scheduled for Non fusion transperineal prostate biopsy    CBC CMP HgbA1C urine C&S EKG    Preop instructions given and explained    KESHA precautions per stop bang questionnaire criteria    Pt with small oral opening, Mallampati 4.  Discussed with Dr. Estrada

## 2023-07-01 LAB
CULTURE RESULTS: NO GROWTH — SIGNIFICANT CHANGE UP
SPECIMEN SOURCE: SIGNIFICANT CHANGE UP

## 2023-07-13 ENCOUNTER — TRANSCRIPTION ENCOUNTER (OUTPATIENT)
Age: 68
End: 2023-07-13

## 2023-07-14 ENCOUNTER — TRANSCRIPTION ENCOUNTER (OUTPATIENT)
Age: 68
End: 2023-07-14

## 2023-07-14 ENCOUNTER — RESULT REVIEW (OUTPATIENT)
Age: 68
End: 2023-07-14

## 2023-07-14 ENCOUNTER — OUTPATIENT (OUTPATIENT)
Dept: OUTPATIENT SERVICES | Facility: HOSPITAL | Age: 68
LOS: 1 days | Discharge: HOME | End: 2023-07-14
Payer: COMMERCIAL

## 2023-07-14 ENCOUNTER — APPOINTMENT (OUTPATIENT)
Dept: UROLOGY | Facility: AMBULATORY SURGERY CENTER | Age: 68
End: 2023-07-14

## 2023-07-14 VITALS
HEIGHT: 67 IN | HEART RATE: 65 BPM | OXYGEN SATURATION: 100 % | TEMPERATURE: 98 F | SYSTOLIC BLOOD PRESSURE: 134 MMHG | RESPIRATION RATE: 16 BRPM | DIASTOLIC BLOOD PRESSURE: 80 MMHG | WEIGHT: 166.89 LBS

## 2023-07-14 VITALS
DIASTOLIC BLOOD PRESSURE: 81 MMHG | HEART RATE: 65 BPM | SYSTOLIC BLOOD PRESSURE: 145 MMHG | RESPIRATION RATE: 18 BRPM | TEMPERATURE: 98 F | OXYGEN SATURATION: 100 %

## 2023-07-14 DIAGNOSIS — C61 MALIGNANT NEOPLASM OF PROSTATE: ICD-10-CM

## 2023-07-14 DIAGNOSIS — Z98.89 OTHER SPECIFIED POSTPROCEDURAL STATES: Chronic | ICD-10-CM

## 2023-07-14 DIAGNOSIS — Z93.6 OTHER ARTIFICIAL OPENINGS OF URINARY TRACT STATUS: Chronic | ICD-10-CM

## 2023-07-14 PROCEDURE — G0416: CPT | Mod: 26

## 2023-07-14 PROCEDURE — 88344 IMHCHEM/IMCYTCHM EA MLT ANTB: CPT | Mod: 26

## 2023-07-14 PROCEDURE — 55700: CPT | Mod: 22

## 2023-07-14 PROCEDURE — 76942 ECHO GUIDE FOR BIOPSY: CPT | Mod: 26,59

## 2023-07-14 RX ORDER — LISINOPRIL/HYDROCHLOROTHIAZIDE 10-12.5 MG
1 TABLET ORAL
Refills: 0 | DISCHARGE

## 2023-07-14 RX ORDER — ONDANSETRON 8 MG/1
4 TABLET, FILM COATED ORAL ONCE
Refills: 0 | Status: DISCONTINUED | OUTPATIENT
Start: 2023-07-14 | End: 2023-07-28

## 2023-07-14 RX ORDER — SODIUM CHLORIDE 9 MG/ML
1000 INJECTION, SOLUTION INTRAVENOUS
Refills: 0 | Status: DISCONTINUED | OUTPATIENT
Start: 2023-07-14 | End: 2023-07-28

## 2023-07-14 RX ORDER — FENTANYL CITRATE 50 UG/ML
25 INJECTION INTRAVENOUS
Refills: 0 | Status: DISCONTINUED | OUTPATIENT
Start: 2023-07-14 | End: 2023-07-14

## 2023-07-14 RX ORDER — OXYCODONE HYDROCHLORIDE 5 MG/1
5 TABLET ORAL ONCE
Refills: 0 | Status: DISCONTINUED | OUTPATIENT
Start: 2023-07-14 | End: 2023-07-14

## 2023-07-14 NOTE — ASU DISCHARGE PLAN (ADULT/PEDIATRIC) - FOLLOW UP APPOINTMENTS
may also call Recovery Room (PACU) 24/7 @ [phone]/[facility]

## 2023-07-14 NOTE — ASU DISCHARGE PLAN (ADULT/PEDIATRIC) - PROCEDURE
Assessment & Plan     Zane Doe is a 69 year old female with the following diagnoses:   1. Adie's tonic pupil, right    2. Anisocoria    3. Ptosis of right eyelid       Zane Doe is a 69 year old female who was referred by Dr. Hickman for evaluation of possible third nerve palsy. Patient is pseudophakic in both eyes: cataract surgery on the left eye in November of 2017 with no complications and cataract surgery in 3/2018 on the right eye. Within the first post-op week, she developed some ptosis of her right eyelid but felt like her vision was improved. At her 2nd week post op visit, she noticed that she wasn't reading as well and was found by Dr. Hickman to have cystic macular edema. She was started on pred forte drops for the CME. She then followed with Dr. Hickman every month and on her second month follow up (in May), Dr. Hickman noted anisocoria. Dr. Hickman also noted that apparently Ms. Doe's imaging from two years ago also showed some cystic macular edema and because the CME was not responding to pred forte drops and it was present two years ago, they decided to discontinue the drops. Ms. Doe then received an MRI, which showed no abnormalities, and was recommended to see Dr. Murphy.     Since her cataract surgery, she feels like her vision in the right eye is subjectively getting worse. She notes that her visual acuity when measured by Dr. Hickman has not decreased significantly during her qmonthly follow ups. (Per chart review BCVA in right eye on 5/24/19 was 20/20). Also complains of some blurry vision in her right eye, but no issues with left eye.     She has no history of diabetes or hypertension. Has mildly elevated lipids.     Past Medical History: Migraines (since she was young), Osteoarthritis  Past Ocular History: None, s/p cataract surgery  Family Ocular History: None  Medications: Zomig PRN, Lipitor 10 mg, Mobic, Soma 350 mg    MRI/MRA 5/24/19: showed no abnormalties, small  Prostate biopsy ischemic changes appropriate for age.     Examination today showed a visual acuity was 20/50 in the right eye and 20/20 in the left eye. Intraocular pressure was 14 in the right eye and 15 in the left eye. The right pupil reacted sluggishly on exam while the left pupil reacted briskly. There was light near dissociation right eye. No APD was present.  Ishihara color plates were 11/11 in the right eye and 11/11 in the left eye. Slit lamp exam was remarkable for unequal iris constriction right eye and PCIOL in both eyes. MRD1 was 1 in the right eye and 3 in the left eye. Extraocular movement was full . CN V1-3, 7-12 were intact.    In summary, Zane Doe is a 69 year old female who presents for follow up regarding new onset ptosis followed by anisocoria in the right eye s/p cataract surgery. We discussed that ptosis occurs after cataract surgery and this is almost certainly what happened. She has good levator function and no diplopia or ophthalmoplegia.  This is not apt to be neurologic.  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 Recommend see oculoplastics.      In terms of her anisocoria, it is high unlikely that she has 3rd nerve palsy. This occurred 2 months after her Cataract extraction.   She could have also had surgical trauma (iris damage), however she did show me a photo in April and her pupils looked symmetric. So she likely has Adies tonic pupil. Her DTR's are intact and this is not apt to be Adies syndrome.      Return to clinic as needed. If symptoms worsen or change, please do not hesitate to return earlier.            Attending Physician Attestation:  Complete documentation of historical and exam elements from today's encounter can be found in the full encounter summary report (not reduplicated in this progress note).  I personally obtained the chief complaint(s) and history of present illness.  I confirmed and edited as necessary the review of systems, past medical/surgical history, family history,  social history, and examination findings as documented by others; and I examined the patient myself.  I personally reviewed the relevant tests, images, and reports as documented above.  I formulated and edited as necessary the assessment and plan and discussed the findings and management plan with the patient and family. - Theodore Ornelas MS4  6/18/2019 at 12:56 PM

## 2023-07-14 NOTE — ASU PATIENT PROFILE, ADULT - FALL HARM RISK - UNIVERSAL INTERVENTIONS
Bed in lowest position, wheels locked, appropriate side rails in place/Call bell, personal items and telephone in reach/Instruct patient to call for assistance before getting out of bed or chair/Non-slip footwear when patient is out of bed/[place] to call system/Physically safe environment - no spills, clutter or unnecessary equipment/Purposeful Proactive Rounding/Room/bathroom lighting operational, light cord in reach

## 2023-07-14 NOTE — ASU DISCHARGE PLAN (ADULT/PEDIATRIC) - CARE PROVIDER_API CALL
Tirso Resendiz.  Urology  [address] [place] [address], [address] [address]  Phone: [phone]  Fax: [phone]  Follow Up Time: Routine

## 2023-07-14 NOTE — ASU DISCHARGE PLAN (ADULT/PEDIATRIC) - CALL YOUR DOCTOR IF YOU HAVE ANY OF THE FOLLOWING:
Unable to urinate Bleeding that does not stop/Pain not relieved by Medications/Fever greater than (need to indicate Fahrenheit or Celsius)/Unable to urinate

## 2023-07-20 LAB — SURGICAL PATHOLOGY STUDY: SIGNIFICANT CHANGE UP

## 2023-08-06 ENCOUNTER — EMERGENCY (EMERGENCY)
Facility: HOSPITAL | Age: 68
LOS: 1 days | Discharge: HOME | End: 2023-08-06
Attending: STUDENT IN AN ORGANIZED HEALTH CARE EDUCATION/TRAINING PROGRAM
Payer: COMMERCIAL

## 2023-08-06 VITALS
HEIGHT: 67 IN | TEMPERATURE: 98 F | DIASTOLIC BLOOD PRESSURE: 80 MMHG | HEART RATE: 81 BPM | OXYGEN SATURATION: 96 % | WEIGHT: 171.96 LBS | SYSTOLIC BLOOD PRESSURE: 139 MMHG | RESPIRATION RATE: 20 BRPM

## 2023-08-06 DIAGNOSIS — Z98.89 OTHER SPECIFIED POSTPROCEDURAL STATES: Chronic | ICD-10-CM

## 2023-08-06 DIAGNOSIS — Z93.6 OTHER ARTIFICIAL OPENINGS OF URINARY TRACT STATUS: Chronic | ICD-10-CM

## 2023-08-06 LAB
ALBUMIN SERPL ELPH-MCNC: 4.2 G/DL — SIGNIFICANT CHANGE UP (ref 3.3–5)
ALP SERPL-CCNC: 44 U/L — SIGNIFICANT CHANGE UP (ref 40–120)
ALT FLD-CCNC: 26 U/L — SIGNIFICANT CHANGE UP (ref 10–45)
ANION GAP SERPL CALC-SCNC: 13 MMOL/L — SIGNIFICANT CHANGE UP (ref 5–17)
APPEARANCE UR: CLEAR — SIGNIFICANT CHANGE UP
AST SERPL-CCNC: 27 U/L — SIGNIFICANT CHANGE UP (ref 10–40)
BACTERIA # UR AUTO: NEGATIVE — SIGNIFICANT CHANGE UP
BASOPHILS # BLD AUTO: 0.06 K/UL — SIGNIFICANT CHANGE UP (ref 0–0.2)
BASOPHILS NFR BLD AUTO: 0.7 % — SIGNIFICANT CHANGE UP (ref 0–2)
BILIRUB SERPL-MCNC: 0.3 MG/DL — SIGNIFICANT CHANGE UP (ref 0.2–1.2)
BILIRUB UR-MCNC: NEGATIVE — SIGNIFICANT CHANGE UP
BUN SERPL-MCNC: 23 MG/DL — SIGNIFICANT CHANGE UP (ref 7–23)
CALCIUM SERPL-MCNC: 9.3 MG/DL — SIGNIFICANT CHANGE UP (ref 8.4–10.5)
CHLORIDE SERPL-SCNC: 103 MMOL/L — SIGNIFICANT CHANGE UP (ref 96–108)
CO2 SERPL-SCNC: 21 MMOL/L — LOW (ref 22–31)
COLOR SPEC: YELLOW — SIGNIFICANT CHANGE UP
CREAT SERPL-MCNC: 0.99 MG/DL — SIGNIFICANT CHANGE UP (ref 0.5–1.3)
DIFF PNL FLD: NEGATIVE — SIGNIFICANT CHANGE UP
EGFR: 83 ML/MIN/1.73M2 — SIGNIFICANT CHANGE UP
EOSINOPHIL # BLD AUTO: 0.27 K/UL — SIGNIFICANT CHANGE UP (ref 0–0.5)
EOSINOPHIL NFR BLD AUTO: 3.1 % — SIGNIFICANT CHANGE UP (ref 0–6)
EPI CELLS # UR: 0 /HPF — SIGNIFICANT CHANGE UP
GLUCOSE SERPL-MCNC: 139 MG/DL — HIGH (ref 70–99)
GLUCOSE UR QL: NEGATIVE — SIGNIFICANT CHANGE UP
HCT VFR BLD CALC: 43.1 % — SIGNIFICANT CHANGE UP (ref 39–50)
HGB BLD-MCNC: 13.8 G/DL — SIGNIFICANT CHANGE UP (ref 13–17)
HYALINE CASTS # UR AUTO: 2 /LPF — SIGNIFICANT CHANGE UP (ref 0–2)
IMM GRANULOCYTES NFR BLD AUTO: 0.3 % — SIGNIFICANT CHANGE UP (ref 0–0.9)
KETONES UR-MCNC: NEGATIVE — SIGNIFICANT CHANGE UP
LEUKOCYTE ESTERASE UR-ACNC: NEGATIVE — SIGNIFICANT CHANGE UP
LYMPHOCYTES # BLD AUTO: 2 K/UL — SIGNIFICANT CHANGE UP (ref 1–3.3)
LYMPHOCYTES # BLD AUTO: 22.9 % — SIGNIFICANT CHANGE UP (ref 13–44)
MCHC RBC-ENTMCNC: 28.9 PG — SIGNIFICANT CHANGE UP (ref 27–34)
MCHC RBC-ENTMCNC: 32 GM/DL — SIGNIFICANT CHANGE UP (ref 32–36)
MCV RBC AUTO: 90.4 FL — SIGNIFICANT CHANGE UP (ref 80–100)
MONOCYTES # BLD AUTO: 1.09 K/UL — HIGH (ref 0–0.9)
MONOCYTES NFR BLD AUTO: 12.5 % — SIGNIFICANT CHANGE UP (ref 2–14)
NEUTROPHILS # BLD AUTO: 5.3 K/UL — SIGNIFICANT CHANGE UP (ref 1.8–7.4)
NEUTROPHILS NFR BLD AUTO: 60.5 % — SIGNIFICANT CHANGE UP (ref 43–77)
NITRITE UR-MCNC: NEGATIVE — SIGNIFICANT CHANGE UP
NRBC # BLD: 0 /100 WBCS — SIGNIFICANT CHANGE UP (ref 0–0)
PH UR: 6 — SIGNIFICANT CHANGE UP (ref 5–8)
PLATELET # BLD AUTO: 197 K/UL — SIGNIFICANT CHANGE UP (ref 150–400)
POTASSIUM SERPL-MCNC: 4.5 MMOL/L — SIGNIFICANT CHANGE UP (ref 3.5–5.3)
POTASSIUM SERPL-SCNC: 4.5 MMOL/L — SIGNIFICANT CHANGE UP (ref 3.5–5.3)
PROT SERPL-MCNC: 7 G/DL — SIGNIFICANT CHANGE UP (ref 6–8.3)
PROT UR-MCNC: SIGNIFICANT CHANGE UP
RBC # BLD: 4.77 M/UL — SIGNIFICANT CHANGE UP (ref 4.2–5.8)
RBC # FLD: 13 % — SIGNIFICANT CHANGE UP (ref 10.3–14.5)
RBC CASTS # UR COMP ASSIST: 1 /HPF — SIGNIFICANT CHANGE UP (ref 0–4)
SODIUM SERPL-SCNC: 137 MMOL/L — SIGNIFICANT CHANGE UP (ref 135–145)
SP GR SPEC: 1.03 — HIGH (ref 1.01–1.02)
UROBILINOGEN FLD QL: NEGATIVE — SIGNIFICANT CHANGE UP
WBC # BLD: 8.75 K/UL — SIGNIFICANT CHANGE UP (ref 3.8–10.5)
WBC # FLD AUTO: 8.75 K/UL — SIGNIFICANT CHANGE UP (ref 3.8–10.5)
WBC UR QL: 1 /HPF — SIGNIFICANT CHANGE UP (ref 0–5)

## 2023-08-06 PROCEDURE — 93975 VASCULAR STUDY: CPT | Mod: 26

## 2023-08-06 PROCEDURE — 99284 EMERGENCY DEPT VISIT MOD MDM: CPT

## 2023-08-06 PROCEDURE — 76870 US EXAM SCROTUM: CPT | Mod: 26

## 2023-08-06 RX ORDER — ACETAMINOPHEN 500 MG
975 TABLET ORAL ONCE
Refills: 0 | Status: COMPLETED | OUTPATIENT
Start: 2023-08-06 | End: 2023-08-06

## 2023-08-06 RX ADMIN — Medication 975 MILLIGRAM(S): at 21:27

## 2023-08-06 NOTE — ED PROVIDER NOTE - PATIENT PORTAL LINK FT
You can access the FollowMyHealth Patient Portal offered by [facility] by registering at the following website: [email or web address]. By joining [employer]’s FollowMyHealth portal, you will also be able to view your health information using other applications (apps) compatible with our system.

## 2023-08-06 NOTE — ED PROVIDER NOTE - OBJECTIVE STATEMENT
68-year-old male patient past medical history kidney stones, hypertension, prostate cancer complains of right testicular pain/swelling x2 days.  Denies trauma, abdominal pain, history of hernia, nausea, vomiting, redness, dysuria, blood in urine.  Notes subjective fever (99.3 at home).

## 2023-08-06 NOTE — ED PROVIDER NOTE - PHYSICAL EXAMINATION
GENERAL: NAD  HEENT:  Atraumatic  CHEST/LUNG: Chest rise equal bilaterally, clear breath sounds b/l  HEART: Regular rate and rhythm  ABDOMEN: Soft, Nontender, Nondistended  : no penile tenderness, right testicular tenderness. No cysts palpated. Chaperone: Ayse Lum  EXTREMITIES:  Extremities warm  PSYCH: A&Ox3  SKIN: No obvious rashes or lesions  MSK: No cervical spine TTP, able to range neck to the left and right/ No midline spinal TTP  NEUROLOGY: strength and sensation intact in all extremities.

## 2023-08-06 NOTE — ED PROVIDER NOTE - CLINICAL SUMMARY MEDICAL DECISION MAKING FREE TEXT BOX
68-year-old male patient past medical history kidney stones, hypertension, prostate cancer complains of right testicular pain/swelling x2 days.  Denies trauma, abdominal pain, history of hernia, nausea, vomiting, redness, dysuria, blood in urine.  Notes subjective fever (99.3 at home).    Due to location of tenderness, most likely epididymitis. Will r/o infection and torsion with US and labs. 68-year-old male patient past medical history kidney stones, hypertension, prostate cancer complains of right testicular pain/swelling x2 days.  Denies trauma, abdominal pain, history of hernia, nausea, vomiting, redness, dysuria, blood in urine.  Notes subjective fever (99.3 at home).    Due to location of tenderness, most likely epididymitis. Will r/o infection and torsion with US and labs. Pt has a urologist for previous kidney stones.

## 2023-08-06 NOTE — ED PROVIDER NOTE - ATTENDING CONTRIBUTION TO CARE
68M hx HTN, prior kidney stones, prostate CA, presenting with R testicular pain/redness/swelling x2d. No fevers, urinary symptoms.  exam with isolated superior/posterior R testicular tenderness, possibly epididymitis. Workup ordered by QDOC, will follow up labs/UA, US to evaluate for UTI, epididymitis/orchitis, testicular torsion. Patient AOx3, appears well, not in acute distress. -Mikayla Anguiano MD (Attending)

## 2023-08-06 NOTE — ED PROVIDER NOTE - PROGRESS NOTE DETAILS
Explained to pt results of ultrasound, Pt agrees to take antibiotics and follow up with urologist within 48hours.

## 2023-08-06 NOTE — ED ADULT NURSE NOTE - NSFALLUNIVINTERV_ED_ALL_ED
Bed/Stretcher in lowest position, wheels locked, appropriate side rails in place/Call bell, personal items and telephone in reach/Instruct patient to call for assistance before getting out of bed/chair/stretcher/Non-slip footwear applied when patient is off stretcher/[place] to call system/Physically safe environment - no spills, clutter or unnecessary equipment/Purposeful proactive rounding/Room/bathroom lighting operational, light cord in reach

## 2023-08-06 NOTE — ED ADULT NURSE NOTE - OBJECTIVE STATEMENT
68y male A&OX4 coming in through triage complaining of testicular pain. PMHX HTN. Pt reports having testicular pain and swelling that started yesterday. Pt states it has happened before and it went away but today it didn't. Pt is able to urinate still and states he wasn't doing anything when the pain started. Pt right testicle swelling. Pt denies chest pain, shortness of breath, abd pain, N/V/D, fever, cough. Labs was drawn and sent to lab. Pt pending dipso.

## 2023-08-06 NOTE — ED ADULT TRIAGE NOTE - TEMPERATURE IN CELSIUS (DEGREES C)
"Subjective   Jessica Ames is a 76 y.o. female.     History of Present Illness    Since the last visit, she has overall felt fairly well.  She has Essential Hypertension and well controlled on current medication, Impaired fasting glucose and will monitor labs to watch for DMII, GERD controlled on PPI Rx, Hyperlipidemia on Zetia and is effective with lowering lipid values, Hypothyroidism and must update labs to continue treatment and Vitamin D deficiency and will update labs for continued management.  she has been compliant with current medications have reviewed them.  The patient denies medication side effects.  Will refill medications. /70   Pulse 53   Temp 97.1 °F (36.2 °C)   Resp 16   Ht 165.1 cm (65\")   Wt 120 kg (264 lb)   LMP  (LMP Unknown)   SpO2 98%   BMI 43.93 kg/m²    Weight up and f/u A1C  Last hematology note  1-8-20  PLAN:   1. Iron deficiency anemia- check CBC, FE, TIBC and Ferritin today  2. Factor 5 Leiden- will likely need prophylaxis for periods of immobility or surgery   3. Afib- on low dose ASA per her cardiologist. Keep routine follow up with them  4. New muscle cramps- add on a CMP  5. History of DVT/PE- completed a course of Xarelto now on low dose ASA  6. Follow up in 1 year   F/u also on B12    Results for orders placed or performed in visit on 04/20/20   Comprehensive metabolic panel    Specimen: Blood   Result Value Ref Range    Glucose 118 (H) 65 - 99 mg/dL    BUN 14 8 - 23 mg/dL    Creatinine 0.88 0.57 - 1.00 mg/dL    eGFR Non African Am 63 >60 mL/min/1.73    eGFR African Am 76 >60 mL/min/1.73    BUN/Creatinine Ratio 15.9 7.0 - 25.0    Sodium 145 136 - 145 mmol/L    Potassium 4.7 3.5 - 5.2 mmol/L    Chloride 108 (H) 98 - 107 mmol/L    Total CO2 27.8 22.0 - 29.0 mmol/L    Calcium 9.5 8.6 - 10.5 mg/dL    Total Protein 6.7 6.0 - 8.5 g/dL    Albumin 4.20 3.50 - 5.20 g/dL    Globulin 2.5 gm/dL    A/G Ratio 1.7 g/dL    Total Bilirubin 0.5 0.2 - 1.2 mg/dL    Alkaline Phosphatase 64 " 39 - 117 U/L    AST (SGOT) 14 1 - 32 U/L    ALT (SGPT) 17 1 - 33 U/L   Lipid panel    Specimen: Blood   Result Value Ref Range    Total Cholesterol 202 (H) 0 - 200 mg/dL    Triglycerides 211 (H) 0 - 150 mg/dL    HDL Cholesterol 43 40 - 60 mg/dL    VLDL Cholesterol 42.2 mg/dL    LDL Cholesterol  117 (H) 0 - 100 mg/dL   Hemoglobin A1c    Specimen: Blood   Result Value Ref Range    Hemoglobin A1C 6.20 (H) 4.80 - 5.60 %         Last EGD 11-1-18 DR Osuna  Cannot take statins; intol  Sees sleep apnea doc  Sees cardio  Factor V and hx PE---Dr Bowman hematologist  Anemia from malabsorption and has been seeing Dr. Bowman  I want her to see cardio for a fib---she is on ASA  Use Cpap/Bipap every night  Has Osteopenia last DEXA 2-18 and need update and on Actonel  Watching varicose veins  She has had lower abdominal pain for a few years and did see Dr. Osuna for this but with her continuing to have this I want her to follow-up with GI and she will make an appointment with [facility]  The following portions of the patient's history were reviewed and updated as appropriate: allergies, current medications, past family history, past medical history, past social history, past surgical history and problem list.    Review of Systems   Constitutional: Positive for activity change and appetite change. Negative for fever.   HENT: Negative for nosebleeds and trouble swallowing.    Eyes: Negative for visual disturbance.   Respiratory: Negative for choking and stridor.    Cardiovascular: Negative for chest pain.   Gastrointestinal: Positive for abdominal pain (not new). Negative for blood in stool.   Endocrine: Negative for polydipsia.   Genitourinary: Negative for genital sores and hematuria.   Musculoskeletal: Negative for joint swelling.   Skin: Negative for color change and rash.   Allergic/Immunologic: Negative for immunocompromised state.   Neurological: Negative for seizures, facial asymmetry and speech difficulty.   Hematological:  Negative for adenopathy. Bruises/bleeds easily.   Psychiatric/Behavioral: Negative for behavioral problems, self-injury and suicidal ideas.       Objective   Physical Exam  Vitals signs and nursing note reviewed.   Constitutional:       General: She is not in acute distress.     Appearance: Normal appearance. She is well-developed. She is obese. She is not ill-appearing or toxic-appearing.   HENT:      Head: Normocephalic.      Right Ear: External ear normal.      Left Ear: External ear normal.      Nose: Nose normal.      Mouth/Throat:      Pharynx: Oropharynx is clear.   Eyes:      General: No scleral icterus.     Conjunctiva/sclera: Conjunctivae normal.      Pupils: Pupils are equal, round, and reactive to light.   Neck:      Musculoskeletal: Normal range of motion and neck supple.      Thyroid: No thyromegaly.      Vascular: No carotid bruit.   Cardiovascular:      Rate and Rhythm: Normal rate and regular rhythm.      Pulses: Normal pulses.      Heart sounds: Normal heart sounds. No murmur.   Pulmonary:      Effort: Pulmonary effort is normal. No respiratory distress.      Breath sounds: Normal breath sounds.   Musculoskeletal: Normal range of motion.         General: No deformity.      Right lower leg: Edema present.      Left lower leg: Edema present.      Comments: Trace pedal edema = bilat     Skin:     General: Skin is warm and dry.      Coloration: Skin is not jaundiced.      Findings: No rash.   Neurological:      General: No focal deficit present.      Mental Status: She is alert and oriented to person, place, and time. Mental status is at baseline.      Coordination: Coordination normal.   Psychiatric:         Mood and Affect: Mood normal.         Behavior: Behavior normal.         Thought Content: Thought content normal.         Judgment: Judgment normal.         Assessment/Plan   Jessica was seen today for hypertension, hyperlipidemia, hypothyroidism, heartburn and impaired fasting glucose.    Diagnoses  and all orders for this visit:    Essential hypertension  -     Comprehensive metabolic panel  -     Lipid panel  -     CBC and Differential  -     TSH  -     Hemoglobin A1c  -     T3, Free  -     T4, Free  -     Vitamin D 25 Hydroxy  -     Vitamin B12  -     Folate  -     Ferritin  -     Iron Profile    Impaired fasting glucose  -     Comprehensive metabolic panel  -     Lipid panel  -     CBC and Differential  -     TSH  -     Hemoglobin A1c  -     T3, Free  -     T4, Free  -     Vitamin D 25 Hydroxy  -     Vitamin B12  -     Folate  -     Ferritin  -     Iron Profile    Gastroesophageal reflux disease without esophagitis  -     Comprehensive metabolic panel  -     Lipid panel  -     CBC and Differential  -     TSH  -     Hemoglobin A1c  -     T3, Free  -     T4, Free  -     Vitamin D 25 Hydroxy  -     Vitamin B12  -     Folate  -     Ferritin  -     Iron Profile    Mixed hyperlipidemia  -     Comprehensive metabolic panel  -     Lipid panel  -     CBC and Differential  -     TSH  -     Hemoglobin A1c  -     T3, Free  -     T4, Free  -     Vitamin D 25 Hydroxy  -     Vitamin B12  -     Folate  -     Ferritin  -     Iron Profile    ERIC (obstructive sleep apnea)  -     Comprehensive metabolic panel  -     Lipid panel  -     CBC and Differential  -     TSH  -     Hemoglobin A1c  -     T3, Free  -     T4, Free  -     Vitamin D 25 Hydroxy  -     Vitamin B12  -     Folate  -     Ferritin  -     Iron Profile    Factor 5 Leiden mutation, heterozygous (CMS/HCC)  -     Comprehensive metabolic panel  -     Lipid panel  -     CBC and Differential  -     TSH  -     Hemoglobin A1c  -     T3, Free  -     T4, Free  -     Vitamin D 25 Hydroxy  -     Vitamin B12  -     Folate  -     Ferritin  -     Iron Profile    Permanent atrial fibrillation (CMS/HCC)  -     Comprehensive metabolic panel  -     Lipid panel  -     CBC and Differential  -     TSH  -     Hemoglobin A1c  -     T3, Free  -     T4, Free  -     Vitamin D 25 Hydroxy  -      Vitamin B12  -     Folate  -     Ferritin  -     Iron Profile    Iron deficiency  -     Comprehensive metabolic panel  -     Lipid panel  -     CBC and Differential  -     TSH  -     Hemoglobin A1c  -     T3, Free  -     T4, Free  -     Vitamin D 25 Hydroxy  -     Vitamin B12  -     Folate  -     Ferritin  -     Iron Profile    Post-menopausal  -     DEXA Bone Density Axial; Future  -     Comprehensive metabolic panel  -     Lipid panel  -     CBC and Differential  -     TSH  -     Hemoglobin A1c  -     T3, Free  -     T4, Free  -     Vitamin D 25 Hydroxy  -     Vitamin B12  -     Folate  -     Ferritin  -     Iron Profile    Osteopenia of multiple sites  -     Comprehensive metabolic panel  -     Lipid panel  -     CBC and Differential  -     TSH  -     Hemoglobin A1c  -     T3, Free  -     T4, Free  -     Vitamin D 25 Hydroxy  -     Vitamin B12  -     Folate  -     Ferritin  -     Iron Profile    Hypothyroidism, acquired  -     Comprehensive metabolic panel  -     Lipid panel  -     CBC and Differential  -     TSH  -     Hemoglobin A1c  -     T3, Free  -     T4, Free  -     Vitamin D 25 Hydroxy  -     Vitamin B12  -     Folate  -     Ferritin  -     Iron Profile    Other orders  -     metoprolol succinate XL (TOPROL-XL) 25 MG 24 hr tablet; TAKE ONE-HALF (1/2) TO ONE TABLET DAILY FOR BLOOD PRESSURE AND HEART RATE CONTROL  -     losartan (COZAAR) 25 MG tablet; Take 1 tablet by mouth Daily. for blood pressure  -     ezetimibe (ZETIA) 10 MG tablet; Take 1 tablet by mouth Daily. for cholesterol  -     risedronate (ACTONEL) 150 MG tablet; Take 1 tablet by mouth Every 30 (Thirty) Days. with water on empty stomach, nothing by mouth or lie down for next 30 minutes for Osteopenia  -     omeprazole (priLOSEC) 40 MG capsule; Take 1 capsule by mouth 2 (Two) Times a Day. For GERD  -     levothyroxine (SYNTHROID, LEVOTHROID) 50 MCG tablet; Take 1 tablet by mouth Daily. For thyroid      Plan, Jessica Ames, was seen today.  she  was seen for HTN and continue medication, Imparied fasting glucose and plan follow up labs, diet, and exercise, GERD and will continue on PPI medication, Hyperlipidemia and will continue current medication, Atrial Fibrillation and remains on medication for treatment, Hypothyroidism and will need to update labs for continued treatment and Vitamin D deficiency and will update labs .  See GI  labs         Answers for HPI/ROS submitted by the patient on 10/5/2020   What is the primary reason for your visit?: Physical     36.9

## 2023-08-06 NOTE — ED PROVIDER NOTE - RAPID ASSESSMENT
MD Tucker: 68-year-old male, past medical history of hypertension, prostate cancer(being surveilled), and kidney stones, presents to ED complaining of worsening right testicular pain/swelling since yesterday.  Patient also endorses subjective fever/chills.  Patient denies fever/chills, chest pain, shortness of breath, abdominal pain, flank pain, urinary symptoms, lightheadedness/dizziness, weakness/numbness, nausea/vomiting.    Patient was rapidly assessed via a telemedicine and/or role of Quick Triage Doctor; a limited history, physical exam and assessment was performed. The patient will be seen and further evaluated in the main emergency department. The remainder of care and evaluation will be conducted by the primary emergency medicine team. Receiving team will follow up on labs, imaging and serially reassess patient as indicated. All further decisions regarding patient care, evaluation and disposition are at the discretion of the receiving primary emergency department team.

## 2023-08-06 NOTE — ED PROVIDER NOTE - NSFOLLOWUPINSTRUCTIONS_ED_ALL_ED_FT
You came to the ED today for testicular pain. Your testicular ultrasound today showed right epididymitis with right hydrocele. Please take Tylenol as needed for pain. Please take your antibiotics as prescribed. Please follow up with your urologist within 48hours. If you are experiencing any increased pain, fevers please come back to ER immediately.    Epididymitis  The male reproductive organ, showing the epididymis and the testicle.  Epididymitis is inflammation or swelling of the epididymis. This is caused by an infection. The epididymis is a cord-like structure that is located along the top and back part of the testicle. It collects and stores sperm from the testicle.    This condition can also cause pain and swelling of the testicle and scrotum. Symptoms usually start suddenly (acute epididymitis). Sometimes epididymitis starts gradually and lasts for a while (chronic epididymitis). Chronic epididymitis may be harder to treat.    What are the causes?  In men ages 20–40, this condition is usually caused by a bacterial infection or a sexually transmitted infection (STI), such as gonorrhea or chlamydia.    In men 40 and older, this condition is usually caused by bacteria from a urinary blockage or from abnormalities in the urinary system. These can result from:  Having a tube placed into the bladder (urinary catheter).  Having an enlarged or inflamed prostate gland.  Having recently had urinary tract surgery.  Having a problem with a backward flow of urine (retrograde).  In men who have a condition that weakens the body's defense system (immune system), such as human immunodeficiency virus (HIV), this condition can be caused by:  Other bacteria, including tuberculosis and syphilis.  Viruses.  Fungi.  Sometimes this condition occurs without infection. This may happen because of trauma or repetitive activities such as sports.    What increases the risk?  You are more likely to develop this condition if you have:  Unprotected sex with more than one partner.  Anal sex.  Had recent surgery.  A urinary catheter.  Urinary problems.  A suppressed immune system.  What are the signs or symptoms?  This condition usually begins suddenly with chills, fever, and pain behind the scrotum and in the testicle. Other symptoms include:  Swelling of the scrotum, testicle, or both.  Pain when ejaculating or urinating.  Pain in the back or abdomen.  Nausea.  Itching and discharge from the penis.  A frequent need to pass urine.  Redness, increased warmth, and tenderness of the scrotum.  How is this diagnosed?  Your health care provider can diagnose this condition based on your symptoms and medical history. Your health care provider will also do a physical exam to check your scrotum and testicle for swelling, pain, and redness. You may also have other tests, including:  Testing of discharge from the penis.  Testing your urine for infections, such as STIs.  Ultrasound to check for blood flow and inflammation.  Your health care provider may test you for other STIs, including HIV.    How is this treated?  Treatment for this condition depends on the cause. If your condition is caused by a bacterial infection, oral antibiotic medicine may be prescribed. If the bacterial infection has spread to your blood, you may need to receive IV antibiotics.    For both bacterial and nonbacterial epididymitis, you may be treated with:  Rest.  Elevation of the scrotum.  Pain medicines.  Anti-inflammatory medicines.  Surgery may be needed if:  You have pus buildup in the scrotum (abscess).  You have epididymitis that has not responded to other treatments.  Follow these instructions at home:  Medicines    Take over-the-counter and prescription medicines only as told by your health care provider.  If you were prescribed an antibiotic medicine, take it as told by your health care provider. Do not stop taking the antibiotic even if your condition improves.  Sexual activity    If your epididymitis was caused by an STI, avoid sexual activity until your treatment is complete.  Inform your sexual partner or partners if you test positive for an STI. They may need to be treated. Do not engage in sexual activity with your partner or partners until their treatment is completed.  Managing pain and swelling    A bathtub partially filled with water.  If directed, raise (elevate) your scrotum and apply ice. To do this:  Put ice in a plastic bag.  Place a small towel or pillow between your legs.  Rest your scrotum on the pillow or towel.  Place another towel between your skin and the plastic bag.  Leave the ice on for 20 minutes, 2–3 times a day.  Remove the ice if your skin turns bright red. This is very important. If you cannot feel pain, heat, or cold, you have a greater risk of damage to the area.  Keep your scrotum elevated and supported while resting. Ask your health care provider if you should wear a scrotal support, such as a jockstrap. Wear it as told by your health care provider.  Try taking a sitz bath to help with discomfort. This is a warm water bath that is taken while you are sitting down. The water should come up to your hips and should cover your buttocks. Do this 3–4 times per day or as told by your health care provider.  General instructions    Drink enough fluid to keep your urine pale yellow.  Return to your normal activities as told by your health care provider. Ask your health care provider what activities are safe for you.  Keep all follow-up visits. This is important.  Contact a health care provider if:  You have a fever.  Your pain medicine is not helping.  Your pain is getting worse.  Your symptoms do not improve within 3 days.    Summary  Epididymitis is inflammation or swelling of the epididymis. This is caused by an infection. This condition can also cause pain and swelling of the testicle and scrotum.  Treatment for this condition depends on the cause. If your condition is caused by a bacterial infection, oral antibiotic medicine may be prescribed.  Inform your sexual partner or partners if you test positive for an STI. They may need to be treated. Do not engage in sexual activity with your partner or partners until their treatment is completed.  Contact a health care provider if your symptoms do not improve within 3 days.  This information is not intended to replace advice given to you by your health care provider. Make sure you discuss any questions you have with your health care provider.

## 2023-08-07 VITALS
SYSTOLIC BLOOD PRESSURE: 133 MMHG | OXYGEN SATURATION: 99 % | HEART RATE: 73 BPM | RESPIRATION RATE: 17 BRPM | TEMPERATURE: 98 F | DIASTOLIC BLOOD PRESSURE: 87 MMHG

## 2023-08-07 PROBLEM — C61 MALIGNANT NEOPLASM OF PROSTATE: Chronic | Status: ACTIVE | Noted: 2023-06-30

## 2023-08-07 LAB
CULTURE RESULTS: NO GROWTH — SIGNIFICANT CHANGE UP
SPECIMEN SOURCE: SIGNIFICANT CHANGE UP

## 2023-08-07 PROCEDURE — 85025 COMPLETE CBC W/AUTO DIFF WBC: CPT

## 2023-08-07 PROCEDURE — 81001 URINALYSIS AUTO W/SCOPE: CPT

## 2023-08-07 PROCEDURE — 87086 URINE CULTURE/COLONY COUNT: CPT

## 2023-08-07 PROCEDURE — 36415 COLL VENOUS BLD VENIPUNCTURE: CPT

## 2023-08-07 PROCEDURE — 76870 US EXAM SCROTUM: CPT

## 2023-08-07 PROCEDURE — 80053 COMPREHEN METABOLIC PANEL: CPT

## 2023-08-07 PROCEDURE — 99285 EMERGENCY DEPT VISIT HI MDM: CPT | Mod: 25

## 2023-08-07 PROCEDURE — 93975 VASCULAR STUDY: CPT

## 2023-08-07 RX ADMIN — Medication 100 MILLIGRAM(S): at 00:28

## 2023-08-08 ENCOUNTER — APPOINTMENT (OUTPATIENT)
Dept: UROLOGY | Facility: CLINIC | Age: 68
End: 2023-08-08
Payer: COMMERCIAL

## 2023-08-08 VITALS — SYSTOLIC BLOOD PRESSURE: 134 MMHG | DIASTOLIC BLOOD PRESSURE: 80 MMHG | TEMPERATURE: 97.1 F | HEART RATE: 79 BPM

## 2023-08-08 DIAGNOSIS — N50.811 RIGHT TESTICULAR PAIN: ICD-10-CM

## 2023-08-08 DIAGNOSIS — N43.3 HYDROCELE, UNSPECIFIED: ICD-10-CM

## 2023-08-08 PROCEDURE — 99214 OFFICE O/P EST MOD 30 MIN: CPT

## 2023-08-08 NOTE — HISTORY OF PRESENT ILLNESS
[FreeTextEntry1] : 68 y.o male- pt of Dr Resendiz with cc of right testicular pain and right testicular swelling since 8/2/23. He went to ED 8/6/23 and US scrotum showed right epidymitis .Urine cx was negative. He denies fevers/ and chills, no N/V , and no hematuria. Today he has right sided testicular pain. He has been taking Doxycycline 100 mg po q 12 h since 8/6/23 and feels no improvement. He took Tylenol  mg sporadically. Took Advil once or twice. pt reports having had 2 episodes of epidymitis in past 6-8 weeks. Had Transperineal  prostate bx 7/14/23 - low grade disease, Heather 6 on AS   Voids with some LUTS q 1 h daytime and nocturia X 2  Constipation - recent - measures reviewed. Supportive briefs Use of Jock strap Use of heat FOR 15 mins to scrotum BID Brock BATH TWICE DAILY  UA microscopic analysis and cx Stop Doxycycline Start Ciprofloxacin 500 mg po q 12 h X 14 days.  Naproxen 500 mg po q 12 h with meals  Follow up with Dr Resendiz

## 2023-08-08 NOTE — REVIEW OF SYSTEMS
[Abdominal Pain] : no abdominal pain [Vomiting] : no vomiting [Constipation] : constipation [Dysuria] : no dysuria [Incontinence] : no incontinence [Nocturia] : nocturia [see HPI] : see HPI [Anxiety] : anxiety [Negative] : Heme/Lymph

## 2023-08-08 NOTE — PHYSICAL EXAM
[General Appearance - Well Developed] : well developed [General Appearance - Well Nourished] : well nourished [Normal Appearance] : normal appearance [Well Groomed] : well groomed [General Appearance - In No Acute Distress] : no acute distress [Abdomen Soft] : soft [Abdomen Tenderness] : non-tender [Costovertebral Angle Tenderness] : no ~M costovertebral angle tenderness [Urethral Meatus] : meatus normal [Penis Abnormality] : normal circumcised penis [FreeTextEntry1] : Right testicular swelling, tender to touch, unable to perform exam due to pt discomfort,left testes normal non tender to palpation  [Edema] : no peripheral edema [] : no respiratory distress [Oriented To Time, Place, And Person] : oriented to person, place, and time [Normal Station and Gait] : the gait and station were normal for the patient's age

## 2023-08-09 LAB
APPEARANCE: CLEAR
BACTERIA: NEGATIVE /HPF
BILIRUBIN URINE: NEGATIVE
BLOOD URINE: NEGATIVE
CAST: 0 /LPF
COLOR: YELLOW
EPITHELIAL CELLS: 0 /HPF
GLUCOSE QUALITATIVE U: NEGATIVE MG/DL
KETONES URINE: NEGATIVE MG/DL
LEUKOCYTE ESTERASE URINE: NEGATIVE
MICROSCOPIC-UA: NORMAL
NITRITE URINE: NEGATIVE
PH URINE: 5.5
PROTEIN URINE: NEGATIVE MG/DL
RED BLOOD CELLS URINE: 0 /HPF
SPECIFIC GRAVITY URINE: 1.01
UROBILINOGEN URINE: 0.2 MG/DL
WHITE BLOOD CELLS URINE: 0 /HPF

## 2023-08-10 ENCOUNTER — NON-APPOINTMENT (OUTPATIENT)
Age: 68
End: 2023-08-10

## 2023-11-03 ENCOUNTER — NON-APPOINTMENT (OUTPATIENT)
Age: 68
End: 2023-11-03

## 2023-11-03 ENCOUNTER — APPOINTMENT (OUTPATIENT)
Dept: OTOLARYNGOLOGY | Facility: CLINIC | Age: 68
End: 2023-11-03
Payer: COMMERCIAL

## 2023-11-03 VITALS — HEIGHT: 67 IN | WEIGHT: 168 LBS | BODY MASS INDEX: 26.37 KG/M2

## 2023-11-03 DIAGNOSIS — J01.90 ACUTE SINUSITIS, UNSPECIFIED: ICD-10-CM

## 2023-11-03 DIAGNOSIS — R09.81 NASAL CONGESTION: ICD-10-CM

## 2023-11-03 PROCEDURE — 31231 NASAL ENDOSCOPY DX: CPT

## 2023-11-03 PROCEDURE — 99204 OFFICE O/P NEW MOD 45 MIN: CPT | Mod: 25

## 2023-11-03 RX ORDER — LORATADINE 5 MG/5 ML
0.05 SOLUTION, ORAL ORAL
Qty: 1 | Refills: 0 | Status: ACTIVE | COMMUNITY
Start: 2023-11-03 | End: 1900-01-01

## 2023-11-13 ENCOUNTER — APPOINTMENT (OUTPATIENT)
Dept: UROLOGY | Facility: CLINIC | Age: 68
End: 2023-11-13
Payer: COMMERCIAL

## 2023-11-13 ENCOUNTER — OUTPATIENT (OUTPATIENT)
Dept: OUTPATIENT SERVICES | Facility: HOSPITAL | Age: 68
LOS: 1 days | End: 2023-11-13
Payer: COMMERCIAL

## 2023-11-13 DIAGNOSIS — Z93.6 OTHER ARTIFICIAL OPENINGS OF URINARY TRACT STATUS: Chronic | ICD-10-CM

## 2023-11-13 DIAGNOSIS — Z98.89 OTHER SPECIFIED POSTPROCEDURAL STATES: Chronic | ICD-10-CM

## 2023-11-13 DIAGNOSIS — N50.819 TESTICULAR PAIN, UNSPECIFIED: ICD-10-CM

## 2023-11-13 PROCEDURE — 76870 US EXAM SCROTUM: CPT

## 2023-11-13 PROCEDURE — 99214 OFFICE O/P EST MOD 30 MIN: CPT

## 2023-11-13 PROCEDURE — 76870 US EXAM SCROTUM: CPT | Mod: 26

## 2023-11-13 RX ORDER — IBUPROFEN 600 MG/1
600 TABLET, FILM COATED ORAL TWICE DAILY
Qty: 30 | Refills: 3 | Status: COMPLETED | COMMUNITY
Start: 2023-11-13 | End: 2023-11-27

## 2023-11-13 RX ORDER — AMOXICILLIN AND CLAVULANATE POTASSIUM 875; 125 MG/1; MG/1
875-125 TABLET, COATED ORAL
Qty: 20 | Refills: 0 | Status: COMPLETED | COMMUNITY
Start: 2023-11-03 | End: 2023-11-13

## 2023-11-13 RX ORDER — CIPROFLOXACIN HYDROCHLORIDE 500 MG/1
500 TABLET, FILM COATED ORAL TWICE DAILY
Qty: 28 | Refills: 0 | Status: COMPLETED | COMMUNITY
Start: 2023-08-08 | End: 2023-11-13

## 2023-11-13 RX ORDER — NAPROXEN 500 MG/1
500 TABLET ORAL
Qty: 20 | Refills: 0 | Status: COMPLETED | COMMUNITY
Start: 2023-08-08 | End: 2023-11-13

## 2023-11-13 RX ORDER — METHYLPREDNISOLONE 4 MG/1
4 TABLET ORAL
Qty: 1 | Refills: 0 | Status: COMPLETED | COMMUNITY
Start: 2023-11-03 | End: 2023-11-13

## 2023-11-14 DIAGNOSIS — N45.1 EPIDIDYMITIS: ICD-10-CM

## 2023-11-14 DIAGNOSIS — N50.819 TESTICULAR PAIN, UNSPECIFIED: ICD-10-CM

## 2023-11-14 LAB
APPEARANCE: CLEAR
BACTERIA: NEGATIVE /HPF
BILIRUBIN URINE: NEGATIVE
BLOOD URINE: NEGATIVE
CAST: 0 /LPF
COLOR: YELLOW
EPITHELIAL CELLS: 0 /HPF
GLUCOSE QUALITATIVE U: NEGATIVE MG/DL
KETONES URINE: NEGATIVE MG/DL
LEUKOCYTE ESTERASE URINE: NEGATIVE
MICROSCOPIC-UA: NORMAL
NITRITE URINE: NEGATIVE
PH URINE: 6
PROTEIN URINE: NEGATIVE MG/DL
RED BLOOD CELLS URINE: 0 /HPF
SPECIFIC GRAVITY URINE: 1.01
UROBILINOGEN URINE: 0.2 MG/DL
WHITE BLOOD CELLS URINE: 0 /HPF

## 2023-11-27 ENCOUNTER — NON-APPOINTMENT (OUTPATIENT)
Age: 68
End: 2023-11-27

## 2023-11-27 DIAGNOSIS — R39.15 URGENCY OF URINATION: ICD-10-CM

## 2023-11-29 DIAGNOSIS — N45.1 EPIDIDYMITIS: ICD-10-CM

## 2023-11-29 LAB
APPEARANCE: CLEAR
BACTERIA: NEGATIVE /HPF
BILIRUBIN URINE: NEGATIVE
BLOOD URINE: NEGATIVE
CAST: 0 /LPF
COLOR: YELLOW
EPITHELIAL CELLS: 0 /HPF
GLUCOSE QUALITATIVE U: NEGATIVE MG/DL
KETONES URINE: NEGATIVE MG/DL
LEUKOCYTE ESTERASE URINE: NEGATIVE
MICROSCOPIC-UA: NORMAL
NITRITE URINE: NEGATIVE
PH URINE: 6.5
PROTEIN URINE: NEGATIVE MG/DL
RED BLOOD CELLS URINE: 0 /HPF
SPECIFIC GRAVITY URINE: 1.01
UROBILINOGEN URINE: 0.2 MG/DL
WHITE BLOOD CELLS URINE: 0 /HPF

## 2023-11-29 RX ORDER — LEVOFLOXACIN 500 MG/1
500 TABLET, FILM COATED ORAL DAILY
Qty: 14 | Refills: 0 | Status: COMPLETED | COMMUNITY
Start: 2023-11-13 | End: 2023-12-13

## 2023-11-30 LAB — BACTERIA UR CULT: NORMAL

## 2023-12-08 ENCOUNTER — NON-APPOINTMENT (OUTPATIENT)
Age: 68
End: 2023-12-08

## 2023-12-15 ENCOUNTER — APPOINTMENT (OUTPATIENT)
Dept: OTOLARYNGOLOGY | Facility: CLINIC | Age: 68
End: 2023-12-15

## 2024-01-12 ENCOUNTER — APPOINTMENT (OUTPATIENT)
Dept: INTERNAL MEDICINE | Facility: CLINIC | Age: 69
End: 2024-01-12
Payer: COMMERCIAL

## 2024-01-12 VITALS
WEIGHT: 172 LBS | BODY MASS INDEX: 27 KG/M2 | DIASTOLIC BLOOD PRESSURE: 66 MMHG | SYSTOLIC BLOOD PRESSURE: 122 MMHG | HEIGHT: 67 IN

## 2024-01-12 DIAGNOSIS — N13.8 BENIGN PROSTATIC HYPERPLASIA WITH LOWER URINARY TRACT SYMPMS: ICD-10-CM

## 2024-01-12 DIAGNOSIS — N40.1 BENIGN PROSTATIC HYPERPLASIA WITH LOWER URINARY TRACT SYMPMS: ICD-10-CM

## 2024-01-12 PROCEDURE — G2211 COMPLEX E/M VISIT ADD ON: CPT

## 2024-01-12 PROCEDURE — 99204 OFFICE O/P NEW MOD 45 MIN: CPT

## 2024-01-12 NOTE — PHYSICAL EXAM
[No Acute Distress] : no acute distress [No JVD] : no jugular venous distention [Clear to Auscultation] : lungs were clear to auscultation bilaterally [Normal Rate] : normal rate  [Regular Rhythm] : with a regular rhythm [No Murmur] : no murmur heard [No Carotid Bruits] : no carotid bruits [No Focal Deficits] : no focal deficits [Alert and Oriented x3] : oriented to person, place, and time [de-identified] : Benign

## 2024-01-12 NOTE — HEALTH RISK ASSESSMENT
[No] : No [0] : 2) Feeling down, depressed, or hopeless: Not at all (0) [PHQ-2 Negative - No further assessment needed] : PHQ-2 Negative - No further assessment needed [Audit-CScore] : 1 [[id number]] : 0 [Never] : Never

## 2024-01-12 NOTE — HISTORY OF PRESENT ILLNESS
[de-identified] : 68-year-old male presents for initial visit to establish care and to review his medical history and medications. Has a history of hypertension, low-grade prostate cancer and BPH/LUTS. Has been generally well without recent illness. Last colonoscopy was over 10 years ago but that was normal at that time. He does follow regularly with his urologist. He is  with 3 grown children.  He is originally from [place] in this country for approximately [place] years.  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

## 2024-01-12 NOTE — ASSESSMENT
[FreeTextEntry1] : His exam is unremarkable.  Hypertension-his blood pressure is controlled and for now he will continue lisinopril HCT 20/12.5 daily  History of prostate cancer/BPH-he does follow regularly with urology.  His prostate cancer was [facility] 6 and has been doing watchful waiting.  He is presently on Flomax 0.4 mg daily.  Last colonoscopy was over 10 years ago.  We discussed a follow-up exam and is something he will consider.  He will follow-up in approxi-3 months.

## 2024-01-12 NOTE — REVIEW OF SYSTEMS
[Chest Pain] : no chest pain [Palpitations] : no palpitations [Shortness Of Breath] : no shortness of breath [Abdominal Pain] : no abdominal pain [Headache] : no headache [Dizziness] : no dizziness

## 2024-01-30 ENCOUNTER — APPOINTMENT (OUTPATIENT)
Dept: UROLOGY | Facility: CLINIC | Age: 69
End: 2024-01-30
Payer: COMMERCIAL

## 2024-01-30 ENCOUNTER — OUTPATIENT (OUTPATIENT)
Dept: OUTPATIENT SERVICES | Facility: HOSPITAL | Age: 69
LOS: 1 days | End: 2024-01-30
Payer: COMMERCIAL

## 2024-01-30 DIAGNOSIS — Z98.89 OTHER SPECIFIED POSTPROCEDURAL STATES: Chronic | ICD-10-CM

## 2024-01-30 DIAGNOSIS — Z93.6 OTHER ARTIFICIAL OPENINGS OF URINARY TRACT STATUS: Chronic | ICD-10-CM

## 2024-01-30 DIAGNOSIS — R35.0 FREQUENCY OF MICTURITION: ICD-10-CM

## 2024-01-30 DIAGNOSIS — D41.01 NEOPLASM OF UNCERTAIN BEHAVIOR OF RIGHT KIDNEY: ICD-10-CM

## 2024-01-30 DIAGNOSIS — N20.0 CALCULUS OF KIDNEY: ICD-10-CM

## 2024-01-30 LAB
PSA FREE FLD-MCNC: 14 %
PSA FREE SERPL-MCNC: 0.9 NG/ML
PSA SERPL-MCNC: 6.48 NG/ML

## 2024-01-30 PROCEDURE — 76775 US EXAM ABDO BACK WALL LIM: CPT | Mod: 26

## 2024-01-30 PROCEDURE — G2211 COMPLEX E/M VISIT ADD ON: CPT | Mod: NC,1L

## 2024-01-30 PROCEDURE — 76775 US EXAM ABDO BACK WALL LIM: CPT

## 2024-01-30 PROCEDURE — 99213 OFFICE O/P EST LOW 20 MIN: CPT

## 2024-01-31 NOTE — HISTORY OF PRESENT ILLNESS
[FreeTextEntry1] : Prostate cancer: Was diagnosed with low volume, low risk prostate cancer April 20, 2021. Biopsy: 1/13 cores, Heather 6. Currently on active surveillance.  9/29/2022 PSA 4.89 ng/mL, 13%free 2/10/2022 PSA 5.65 ng/mL, 15% free 12/23/2021 PSA 13.2 ng/mL, 18% free 9/29/2022 PSA 4.89 ng/mL, 13% free 4/28/2023 PSA 4.91 ng/mL, 16% free 01/25/2024  PSA 6.48 ng/mL  Last MRI May 19, 2023 - No targetable lesion, Volume 41mL   Right lower pole renal lesion: Underwent office renal ultrasound today. Images were reviewed with patient.  Findings:  There are multiple septated cysts bilaterally. In right kidney, largest in upper pole, 4.67 x 4.52 x 4.91 cm. In left kidney upper pole, 3.82 x 3.71 x 3.71 cm and septated cystic structure with calcification, 3.86 x 3.39 x 3.76 cm. There are echogenic foci in the midpole with twinkle artifact, 0.36 cm, 0.35 cm. Both kidneys are normal in size and echogenicity without hydronephrosis or solid masses present.  Prior CT a/p w/wo IV contrast Feb 2022: no solid lesion, bilateral renal cysts.  Urinary function: Stable.   No other new complaints.

## 2024-01-31 NOTE — ASSESSMENT
[FreeTextEntry1] : -Repeat PSA in 6 months  - Follow up in one year with renal ultrasound  - Continue tamsulosin for BPH

## 2024-02-01 DIAGNOSIS — D41.01 NEOPLASM OF UNCERTAIN BEHAVIOR OF RIGHT KIDNEY: ICD-10-CM

## 2024-02-01 DIAGNOSIS — N20.0 CALCULUS OF KIDNEY: ICD-10-CM

## 2024-03-21 ENCOUNTER — APPOINTMENT (OUTPATIENT)
Dept: OTOLARYNGOLOGY | Facility: CLINIC | Age: 69
End: 2024-03-21
Payer: COMMERCIAL

## 2024-03-21 VITALS
WEIGHT: 172 LBS | TEMPERATURE: 97.8 F | BODY MASS INDEX: 27 KG/M2 | DIASTOLIC BLOOD PRESSURE: 71 MMHG | SYSTOLIC BLOOD PRESSURE: 110 MMHG | HEIGHT: 67 IN

## 2024-03-21 DIAGNOSIS — J34.2 DEVIATED NASAL SEPTUM: ICD-10-CM

## 2024-03-21 DIAGNOSIS — J34.3 HYPERTROPHY OF NASAL TURBINATES: ICD-10-CM

## 2024-03-21 PROCEDURE — 99213 OFFICE O/P EST LOW 20 MIN: CPT | Mod: 25

## 2024-03-21 PROCEDURE — 31231 NASAL ENDOSCOPY DX: CPT

## 2024-03-21 RX ORDER — OLOPATADINE HYDROCHLORIDE 665 UG/1
0.6 SPRAY, METERED NASAL
Qty: 1 | Refills: 5 | Status: ACTIVE | COMMUNITY
Start: 2024-03-21 | End: 1900-01-01

## 2024-03-21 NOTE — HISTORY OF PRESENT ILLNESS
[de-identified] : co nasal congestion worse in am and thick mucous w blood more than 6 mo somewhat better as day progresses neg hx allergies neg hx sinusitis rx antibiotic amox clav and medrol

## 2024-03-21 NOTE — ASSESSMENT
[FreeTextEntry1] : Exam reveals significant nasal obstruction related nasal septal deviation, hypertrophic inferior nasal turbinates and allergic or nonspecific rhinitis. trail hypertonic saline opatadine spray reviewed options rf turbinates office but could need second procedure for septoplasty small change bleeding requiring cautery or packing expect 50% reduction nasal/postnasal drip and relief of nasal congestion.

## 2024-03-21 NOTE — REVIEW OF SYSTEMS
[Problem Snoring] : problem snoring [Patient Intake Form Reviewed] : Patient intake form was reviewed [Negative] : Nasal [de-identified] : congestion is worse at night,  bloody discharge

## 2024-03-21 NOTE — PROCEDURE
[FreeTextEntry6] : Indication for procedure:  Unable to adequately examine mid and posterior nasal cavity with anterior rhinoscopy The patient has chronic nasal congestion  Sinus endoscope # 200 Nasal septum exam shows septal deviation to the rt at valve 2-3 plus left inferior ev 2 plus The inferior nasal turbinates are moderately hypertrophic in size bilaterally. The sinus endoscope was introduced into the right nares exam right middle meatus reveals no mucopus or inflammation.  The right middle turbinate is WNL. The scope was advanced and the sphenoethmoid region was inspected. The superior meatus, superior turbinate and nasal vault are unremarkable.  The nasopharynx is unremarkable without inflammation or mass. The sinus endoscope was introduced into the left nares and exam left middle meatus reveals no mucopus or inflammation.  The left middle turbinate is WNL. The scope was advanced and the sphenoethmoid region was inspected. The left superior meatus and nasal vault are unremarkable.

## 2024-03-22 ENCOUNTER — APPOINTMENT (OUTPATIENT)
Dept: INTERNAL MEDICINE | Facility: CLINIC | Age: 69
End: 2024-03-22
Payer: COMMERCIAL

## 2024-03-22 VITALS
DIASTOLIC BLOOD PRESSURE: 70 MMHG | SYSTOLIC BLOOD PRESSURE: 110 MMHG | BODY MASS INDEX: 27 KG/M2 | WEIGHT: 172 LBS | HEIGHT: 67 IN

## 2024-03-22 DIAGNOSIS — Z00.00 ENCOUNTER FOR GENERAL ADULT MEDICAL EXAMINATION W/OUT ABNORMAL FINDINGS: ICD-10-CM

## 2024-03-22 DIAGNOSIS — I10 ESSENTIAL (PRIMARY) HYPERTENSION: ICD-10-CM

## 2024-03-22 DIAGNOSIS — C61 MALIGNANT NEOPLASM OF PROSTATE: ICD-10-CM

## 2024-03-22 DIAGNOSIS — G47.9 SLEEP DISORDER, UNSPECIFIED: ICD-10-CM

## 2024-03-22 PROCEDURE — 36415 COLL VENOUS BLD VENIPUNCTURE: CPT

## 2024-03-22 PROCEDURE — 99397 PER PM REEVAL EST PAT 65+ YR: CPT

## 2024-03-22 RX ORDER — IBUPROFEN 600 MG/1
600 TABLET, FILM COATED ORAL 3 TIMES DAILY
Qty: 30 | Refills: 1 | Status: ACTIVE | COMMUNITY
Start: 2023-11-27 | End: 1900-01-01

## 2024-03-22 NOTE — ASSESSMENT
[FreeTextEntry1] : His exam is unremarkable. Fasting labs including lipid profile and thyroid functions were sent.  Hypertension-his blood pressure is controlled and for now he will continue lisinopril HCT 20/12.5 daily  History of prostate cancer/BPH-he does follow regularly with urology.  His prostate cancer was [place] 6 and has been doing watchful waiting.  He is presently on Flomax 0.4 mg daily.  Possible sleep disorder-we discussed the results of the STOP-BANG questionnaire.  He denies any daytime somnolence.  He is not interested in testing at the present time.  Last colonoscopy was over 10 years ago.  He is making appointment today for follow-up consultation.

## 2024-03-22 NOTE — HEALTH RISK ASSESSMENT
[No] : No [0] : 2) Feeling down, depressed, or hopeless: Not at all (0) [PHQ-2 Negative - No further assessment needed] : PHQ-2 Negative - No further assessment needed [[id number]] : 0 [Patient reported colonoscopy was normal] : Patient reported colonoscopy was normal [ColonoscopyComments] : Over 10 years ago [Never] : Never

## 2024-03-22 NOTE — PHYSICAL EXAM
[No Acute Distress] : no acute distress [No JVD] : no jugular venous distention [Clear to Auscultation] : lungs were clear to auscultation bilaterally [Normal Rate] : normal rate  [Regular Rhythm] : with a regular rhythm [No Murmur] : no murmur heard [No Carotid Bruits] : no carotid bruits [No Focal Deficits] : no focal deficits [Alert and Oriented x3] : oriented to person, place, and time [de-identified] : Benign

## 2024-03-22 NOTE — HISTORY OF PRESENT ILLNESS
[de-identified] : 69-year-old male presents for annual wellness visit along with follow-up fasting labs and prescription renewals. Has a history of hypertension, low-grade prostate cancer and BPH/LUTS. Has been generally well without recent illness. Last colonoscopy was over 10 years ago but that was normal at that time. He does follow regularly with his urologist. His last colonoscopy was over 10 years ago and he believes that was normal at that time. He is  with 3 grown children.  He is originally from [place] in this country for approximately 30 years.  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

## 2024-03-23 LAB
ALBUMIN SERPL ELPH-MCNC: 4.5 G/DL
ALP BLD-CCNC: 48 U/L
ALT SERPL-CCNC: 27 U/L
ANION GAP SERPL CALC-SCNC: 12 MMOL/L
AST SERPL-CCNC: 22 U/L
BASOPHILS # BLD AUTO: 0.06 K/UL
BASOPHILS NFR BLD AUTO: 1.1 %
BILIRUB SERPL-MCNC: 0.4 MG/DL
BUN SERPL-MCNC: 22 MG/DL
CALCIUM SERPL-MCNC: 9.8 MG/DL
CHLORIDE SERPL-SCNC: 103 MMOL/L
CHOLEST SERPL-MCNC: 227 MG/DL
CO2 SERPL-SCNC: 24 MMOL/L
CREAT SERPL-MCNC: 0.96 MG/DL
EGFR: 86 ML/MIN/1.73M2
EOSINOPHIL # BLD AUTO: 0.16 K/UL
EOSINOPHIL NFR BLD AUTO: 2.8 %
ESTIMATED AVERAGE GLUCOSE: 128 MG/DL
GLUCOSE SERPL-MCNC: 107 MG/DL
HBA1C MFR BLD HPLC: 6.1 %
HCT VFR BLD CALC: 43.1 %
HDLC SERPL-MCNC: 58 MG/DL
HGB BLD-MCNC: 14.2 G/DL
IMM GRANULOCYTES NFR BLD AUTO: 0.4 %
LDLC SERPL CALC-MCNC: 159 MG/DL
LYMPHOCYTES # BLD AUTO: 1.45 K/UL
LYMPHOCYTES NFR BLD AUTO: 25.5 %
MAN DIFF?: NORMAL
MCHC RBC-ENTMCNC: 29.2 PG
MCHC RBC-ENTMCNC: 32.9 GM/DL
MCV RBC AUTO: 88.7 FL
MONOCYTES # BLD AUTO: 0.67 K/UL
MONOCYTES NFR BLD AUTO: 11.8 %
NEUTROPHILS # BLD AUTO: 3.33 K/UL
NEUTROPHILS NFR BLD AUTO: 58.4 %
NONHDLC SERPL-MCNC: 169 MG/DL
PLATELET # BLD AUTO: 177 K/UL
POTASSIUM SERPL-SCNC: 4.1 MMOL/L
PROT SERPL-MCNC: 7.1 G/DL
RBC # BLD: 4.86 M/UL
RBC # FLD: 12.9 %
SODIUM SERPL-SCNC: 140 MMOL/L
TRIGL SERPL-MCNC: 56 MG/DL
TSH SERPL-ACNC: 0.7 UIU/ML
WBC # FLD AUTO: 5.69 K/UL

## 2024-04-25 NOTE — ED ADULT NURSE NOTE - DISCHARGE DATE/TIME
Drug change request for    eflornithine 13.9 % cream       Medication unavailable please advise on an alternative.      Fax in tray  
Patient is aware that medication cannot be bought commercially and was sent to compounding pharmacy instead.   
07-Aug-2023 00:39

## 2024-05-09 ENCOUNTER — APPOINTMENT (OUTPATIENT)
Dept: GASTROENTEROLOGY | Facility: CLINIC | Age: 69
End: 2024-05-09
Payer: COMMERCIAL

## 2024-05-09 VITALS
WEIGHT: 172 LBS | HEIGHT: 67 IN | SYSTOLIC BLOOD PRESSURE: 142 MMHG | BODY MASS INDEX: 27 KG/M2 | DIASTOLIC BLOOD PRESSURE: 70 MMHG

## 2024-05-09 DIAGNOSIS — Z12.11 ENCOUNTER FOR SCREENING FOR MALIGNANT NEOPLASM OF COLON: ICD-10-CM

## 2024-05-09 PROCEDURE — 99203 OFFICE O/P NEW LOW 30 MIN: CPT

## 2024-05-09 RX ORDER — SODIUM SULFATE, POTASSIUM SULFATE AND MAGNESIUM SULFATE 1.6; 3.13; 17.5 G/177ML; G/177ML; G/177ML
17.5-3.13-1.6 SOLUTION ORAL
Qty: 2 | Refills: 0 | Status: ACTIVE | COMMUNITY
Start: 2024-05-09 | End: 1900-01-01

## 2024-05-09 NOTE — PHYSICAL EXAM
[Alert] : alert [Healthy Appearing] : healthy appearing [Sclera] : the sclera and conjunctiva were normal [Hearing Threshold Finger Rub Not [place]] : hearing was normal [Normal Appearance] : the appearance of the neck was normal [No Respiratory Distress] : no respiratory distress [Auscultation Breath Sounds / Voice Sounds] : lungs were clear to auscultation bilaterally [Heart Rate And Rhythm] : heart rate was normal and rhythm regular [Bowel Sounds] : normal bowel sounds [Abdomen Tenderness] : non-tender [Abdomen Soft] : soft [Abnormal Walk] : normal gait [Normal Color / Pigmentation] : normal skin color and pigmentation [Oriented To Time, Place, And Person] : oriented to person, place, and time

## 2024-05-09 NOTE — ASSESSMENT
[FreeTextEntry1] : Plan: Since previous colonoscopy was more than 10 years ago, would recommend colonoscopy. Risks versus benefits as well as instructions reviewed, pt agrees to planned procedure. all questions answered.

## 2024-05-09 NOTE — HISTORY OF PRESENT ILLNESS
[FreeTextEntry1] : Eloina Larson is a 69 year old male PMH HTN presents today for consult for screening colonoscopy. Pt reports previous colonoscopy was between 10 and 15 years ago, records unavailable. Denies FMH of CRC. Denies bowel complaints, typically has bowel movements regularly without significant straining or overt bleeding such as melena or hematochezia. Denies upper GI symptoms such as GERD, nausea, vomiting, or dysphagia. Denies unintentional weight loss.

## 2024-06-07 ENCOUNTER — RESULT REVIEW (OUTPATIENT)
Age: 69
End: 2024-06-07

## 2024-06-07 ENCOUNTER — APPOINTMENT (OUTPATIENT)
Dept: GASTROENTEROLOGY | Facility: AMBULATORY MEDICAL SERVICES | Age: 69
End: 2024-06-07
Payer: COMMERCIAL

## 2024-06-07 PROCEDURE — 45380 COLONOSCOPY AND BIOPSY: CPT

## 2024-07-08 ENCOUNTER — EMERGENCY (EMERGENCY)
Facility: HOSPITAL | Age: 69
LOS: 1 days | Discharge: HOME | End: 2024-07-08
Attending: EMERGENCY MEDICINE
Payer: COMMERCIAL

## 2024-07-08 VITALS
HEIGHT: 67 IN | OXYGEN SATURATION: 97 % | HEART RATE: 77 BPM | WEIGHT: 171.96 LBS | TEMPERATURE: 98 F | DIASTOLIC BLOOD PRESSURE: 87 MMHG | SYSTOLIC BLOOD PRESSURE: 130 MMHG | RESPIRATION RATE: 20 BRPM

## 2024-07-08 DIAGNOSIS — Z93.6 OTHER ARTIFICIAL OPENINGS OF URINARY TRACT STATUS: Chronic | ICD-10-CM

## 2024-07-08 DIAGNOSIS — Z98.89 OTHER SPECIFIED POSTPROCEDURAL STATES: Chronic | ICD-10-CM

## 2024-07-08 PROCEDURE — 99283 EMERGENCY DEPT VISIT LOW MDM: CPT

## 2024-07-08 PROCEDURE — 99284 EMERGENCY DEPT VISIT MOD MDM: CPT

## 2024-07-08 RX ORDER — PREDNISONE 10 MG/1
2 TABLET ORAL
Qty: 8 | Refills: 0
Start: 2024-07-08 | End: 2024-07-11

## 2024-07-08 RX ORDER — PREDNISONE 10 MG/1
40 TABLET ORAL ONCE
Refills: 0 | Status: COMPLETED | OUTPATIENT
Start: 2024-07-08 | End: 2024-07-08

## 2024-07-08 RX ADMIN — PREDNISONE 40 MILLIGRAM(S): 10 TABLET ORAL at 10:27

## 2024-07-09 DIAGNOSIS — C61 MALIGNANT NEOPLASM OF PROSTATE: ICD-10-CM

## 2024-07-09 LAB
PSA FREE FLD-MCNC: 15 %
PSA FREE SERPL-MCNC: 0.89 NG/ML
PSA SERPL-MCNC: 5.84 NG/ML

## 2024-07-22 ENCOUNTER — NON-APPOINTMENT (OUTPATIENT)
Age: 69
End: 2024-07-22

## 2024-07-25 ENCOUNTER — NON-APPOINTMENT (OUTPATIENT)
Age: 69
End: 2024-07-25

## 2024-07-26 LAB
APPEARANCE: CLEAR
BACTERIA UR CULT: NORMAL
BACTERIA: NEGATIVE /HPF
BILIRUBIN URINE: NEGATIVE
BLOOD URINE: NEGATIVE
CAST: 1 /LPF
COLOR: YELLOW
EPITHELIAL CELLS: 0 /HPF
GLUCOSE QUALITATIVE U: NEGATIVE MG/DL
KETONES URINE: ABNORMAL MG/DL
LEUKOCYTE ESTERASE URINE: NEGATIVE
MICROSCOPIC-UA: NORMAL
NITRITE URINE: NEGATIVE
PH URINE: 5.5
PROTEIN URINE: NEGATIVE MG/DL
RED BLOOD CELLS URINE: 0 /HPF
SPECIFIC GRAVITY URINE: 1.02
UROBILINOGEN URINE: 0.2 MG/DL
WHITE BLOOD CELLS URINE: 4 /HPF

## 2024-07-30 ENCOUNTER — APPOINTMENT (OUTPATIENT)
Dept: UROLOGY | Facility: CLINIC | Age: 69
End: 2024-07-30
Payer: COMMERCIAL

## 2024-07-30 VITALS
DIASTOLIC BLOOD PRESSURE: 81 MMHG | HEIGHT: 67 IN | HEART RATE: 73 BPM | WEIGHT: 172 LBS | OXYGEN SATURATION: 95 % | BODY MASS INDEX: 27 KG/M2 | SYSTOLIC BLOOD PRESSURE: 143 MMHG

## 2024-07-30 DIAGNOSIS — N50.811 RIGHT TESTICULAR PAIN: ICD-10-CM

## 2024-07-30 DIAGNOSIS — N41.0 ACUTE PROSTATITIS: ICD-10-CM

## 2024-07-30 DIAGNOSIS — N45.1 EPIDIDYMITIS: ICD-10-CM

## 2024-07-30 PROCEDURE — 99213 OFFICE O/P EST LOW 20 MIN: CPT

## 2024-07-30 RX ORDER — CEFUROXIME AXETIL 500 MG/1
500 TABLET ORAL
Qty: 8 | Refills: 0 | Status: ACTIVE | COMMUNITY
Start: 2024-07-25 | End: 1900-01-01

## 2024-07-30 NOTE — ASSESSMENT
[FreeTextEntry1] : 69 year old M with history and exam concerning for prostatitis. Recommended 2 week course of antibiotics. Pt will return for visit in 3 weeks for symptom check. If symptoms have resolved and not returned, we will observe. If symptoms improved, but returned with cessation of antibiotics, he will resume antibiotics for full 6 week course. If 2 weeks of antibiotics have no change in symptoms, will search for alternative diagnosis. Pt agrees and understands.  For recurrent epididymitis, nodule felt on exam c/w spermatocele vs abscess vs mass. Recommended scrotal US.

## 2024-07-30 NOTE — HISTORY OF PRESENT ILLNESS
[FreeTextEntry1] : 69 year old man seen 07/30/2024 with complaint of testicle infections. He reports pain in RIGHT scrotum accompanied by fevers. This has recurred a few times, responding to abx but then returning. Denies LUTS other complaints. He is currently on course of cefuroxime and pain has improved.

## 2024-07-30 NOTE — PHYSICAL EXAM
[Normal Appearance] : normal appearance [Well Groomed] : well groomed [General Appearance - In No Acute Distress] : no acute distress [Edema] : no peripheral edema [Respiration, Rhythm And Depth] : normal respiratory rhythm and effort [Exaggerated Use Of Accessory Muscles For Inspiration] : no accessory muscle use [Abdomen Soft] : soft [Abdomen Tenderness] : non-tender [Costovertebral Angle Tenderness] : no ~M costovertebral angle tenderness [Urinary Bladder Findings] : the bladder was normal on palpation [Normal Station and Gait] : the gait and station were normal for the patient's age [] : no rash [No Focal Deficits] : no focal deficits [Oriented To Time, Place, And Person] : oriented to person, place, and time [Affect] : the affect was normal [Mood] : the mood was normal [No Palpable Adenopathy] : no palpable adenopathy [de-identified] : firm tender nodule contiguous with RIGHT testilcle, tender boggy prostate

## 2024-07-30 NOTE — PHYSICAL EXAM
[Normal Appearance] : normal appearance [Well Groomed] : well groomed [General Appearance - In No Acute Distress] : no acute distress [Edema] : no peripheral edema [Respiration, Rhythm And Depth] : normal respiratory rhythm and effort [Exaggerated Use Of Accessory Muscles For Inspiration] : no accessory muscle use [Abdomen Soft] : soft [Abdomen Tenderness] : non-tender [Costovertebral Angle Tenderness] : no ~M costovertebral angle tenderness [Urinary Bladder Findings] : the bladder was normal on palpation [Normal Station and Gait] : the gait and station were normal for the patient's age [] : no rash [No Focal Deficits] : no focal deficits [Oriented To Time, Place, And Person] : oriented to person, place, and time [Affect] : the affect was normal [Mood] : the mood was normal [No Palpable Adenopathy] : no palpable adenopathy [de-identified] : firm tender nodule contiguous with RIGHT testilcle, tender boggy prostate

## 2024-08-02 DIAGNOSIS — N40.1 BENIGN PROSTATIC HYPERPLASIA WITH LOWER URINARY TRACT SYMPMS: ICD-10-CM

## 2024-08-02 DIAGNOSIS — N13.8 BENIGN PROSTATIC HYPERPLASIA WITH LOWER URINARY TRACT SYMPMS: ICD-10-CM

## 2024-08-02 RX ORDER — TAMSULOSIN HYDROCHLORIDE 0.4 MG/1
0.4 CAPSULE ORAL
Qty: 90 | Refills: 3 | Status: ACTIVE | COMMUNITY
Start: 2024-08-02 | End: 1900-01-01

## 2024-08-16 ENCOUNTER — RESULT REVIEW (OUTPATIENT)
Age: 69
End: 2024-08-16

## 2024-08-16 ENCOUNTER — APPOINTMENT (OUTPATIENT)
Dept: ULTRASOUND IMAGING | Facility: CLINIC | Age: 69
End: 2024-08-16
Payer: COMMERCIAL

## 2024-08-16 PROCEDURE — 93975 VASCULAR STUDY: CPT | Mod: 26

## 2024-08-16 PROCEDURE — 76870 US EXAM SCROTUM: CPT | Mod: 26

## 2024-08-26 RX ORDER — CEFUROXIME SODIUM 1.5 G
1 VIAL (EA) INJECTION
Refills: 0 | DISCHARGE
Start: 2024-08-26

## 2024-08-27 ENCOUNTER — NON-APPOINTMENT (OUTPATIENT)
Age: 69
End: 2024-08-27

## 2024-08-27 ENCOUNTER — INPATIENT (INPATIENT)
Facility: HOSPITAL | Age: 69
LOS: 1 days | Discharge: HOME HEALTH | DRG: 728 | End: 2024-08-29
Attending: STUDENT IN AN ORGANIZED HEALTH CARE EDUCATION/TRAINING PROGRAM | Admitting: STUDENT IN AN ORGANIZED HEALTH CARE EDUCATION/TRAINING PROGRAM
Payer: COMMERCIAL

## 2024-08-27 VITALS
RESPIRATION RATE: 18 BRPM | DIASTOLIC BLOOD PRESSURE: 78 MMHG | TEMPERATURE: 98 F | HEART RATE: 93 BPM | HEIGHT: 67 IN | OXYGEN SATURATION: 98 % | SYSTOLIC BLOOD PRESSURE: 128 MMHG | WEIGHT: 168.43 LBS

## 2024-08-27 DIAGNOSIS — Z98.89 OTHER SPECIFIED POSTPROCEDURAL STATES: Chronic | ICD-10-CM

## 2024-08-27 DIAGNOSIS — N45.1 EPIDIDYMITIS: ICD-10-CM

## 2024-08-27 DIAGNOSIS — Z93.6 OTHER ARTIFICIAL OPENINGS OF URINARY TRACT STATUS: Chronic | ICD-10-CM

## 2024-08-27 LAB
ABO RH CONFIRMATION: SIGNIFICANT CHANGE UP
ALBUMIN SERPL ELPH-MCNC: 4.1 G/DL — SIGNIFICANT CHANGE UP (ref 3.3–5)
ALP SERPL-CCNC: 53 U/L — SIGNIFICANT CHANGE UP (ref 40–120)
ALT FLD-CCNC: 34 U/L — SIGNIFICANT CHANGE UP (ref 12–78)
ANION GAP SERPL CALC-SCNC: 5 MMOL/L — SIGNIFICANT CHANGE UP (ref 5–17)
APPEARANCE UR: CLEAR — SIGNIFICANT CHANGE UP
APTT BLD: 27.4 SEC — SIGNIFICANT CHANGE UP (ref 24.5–35.6)
AST SERPL-CCNC: 20 U/L — SIGNIFICANT CHANGE UP (ref 15–37)
BACTERIA # UR AUTO: NEGATIVE /HPF — SIGNIFICANT CHANGE UP
BASOPHILS # BLD AUTO: 0.05 K/UL — SIGNIFICANT CHANGE UP (ref 0–0.2)
BASOPHILS NFR BLD AUTO: 0.4 % — SIGNIFICANT CHANGE UP (ref 0–2)
BILIRUB SERPL-MCNC: 0.8 MG/DL — SIGNIFICANT CHANGE UP (ref 0.2–1.2)
BILIRUB UR-MCNC: NEGATIVE — SIGNIFICANT CHANGE UP
BLD GP AB SCN SERPL QL: SIGNIFICANT CHANGE UP
BUN SERPL-MCNC: 15 MG/DL — SIGNIFICANT CHANGE UP (ref 7–23)
CALCIUM SERPL-MCNC: 9.7 MG/DL — SIGNIFICANT CHANGE UP (ref 8.5–10.1)
CAST: 0 /LPF — SIGNIFICANT CHANGE UP (ref 0–4)
CHLORIDE SERPL-SCNC: 103 MMOL/L — SIGNIFICANT CHANGE UP (ref 96–108)
CO2 SERPL-SCNC: 28 MMOL/L — SIGNIFICANT CHANGE UP (ref 22–31)
COLOR SPEC: YELLOW — SIGNIFICANT CHANGE UP
CREAT SERPL-MCNC: 0.99 MG/DL — SIGNIFICANT CHANGE UP (ref 0.5–1.3)
DIFF PNL FLD: NEGATIVE — SIGNIFICANT CHANGE UP
EGFR: 83 ML/MIN/1.73M2 — SIGNIFICANT CHANGE UP
EOSINOPHIL # BLD AUTO: 0.14 K/UL — SIGNIFICANT CHANGE UP (ref 0–0.5)
EOSINOPHIL NFR BLD AUTO: 1.2 % — SIGNIFICANT CHANGE UP (ref 0–6)
GLUCOSE SERPL-MCNC: 131 MG/DL — HIGH (ref 70–99)
GLUCOSE UR QL: NEGATIVE MG/DL — SIGNIFICANT CHANGE UP
HCT VFR BLD CALC: 46.8 % — SIGNIFICANT CHANGE UP (ref 39–50)
HGB BLD-MCNC: 15.4 G/DL — SIGNIFICANT CHANGE UP (ref 13–17)
IMM GRANULOCYTES NFR BLD AUTO: 0.3 % — SIGNIFICANT CHANGE UP (ref 0–0.9)
INR BLD: 0.98 RATIO — SIGNIFICANT CHANGE UP (ref 0.85–1.18)
KETONES UR-MCNC: NEGATIVE MG/DL — SIGNIFICANT CHANGE UP
LACTATE SERPL-SCNC: 0.9 MMOL/L — SIGNIFICANT CHANGE UP (ref 0.7–2)
LEUKOCYTE ESTERASE UR-ACNC: ABNORMAL
LYMPHOCYTES # BLD AUTO: 1.27 K/UL — SIGNIFICANT CHANGE UP (ref 1–3.3)
LYMPHOCYTES # BLD AUTO: 11.1 % — LOW (ref 13–44)
MCHC RBC-ENTMCNC: 29.1 PG — SIGNIFICANT CHANGE UP (ref 27–34)
MCHC RBC-ENTMCNC: 32.9 GM/DL — SIGNIFICANT CHANGE UP (ref 32–36)
MCV RBC AUTO: 88.5 FL — SIGNIFICANT CHANGE UP (ref 80–100)
MONOCYTES # BLD AUTO: 1.5 K/UL — HIGH (ref 0–0.9)
MONOCYTES NFR BLD AUTO: 13.1 % — SIGNIFICANT CHANGE UP (ref 2–14)
NEUTROPHILS # BLD AUTO: 8.5 K/UL — HIGH (ref 1.8–7.4)
NEUTROPHILS NFR BLD AUTO: 73.9 % — SIGNIFICANT CHANGE UP (ref 43–77)
NITRITE UR-MCNC: NEGATIVE — SIGNIFICANT CHANGE UP
PH UR: 6.5 — SIGNIFICANT CHANGE UP (ref 5–8)
PLATELET # BLD AUTO: 198 K/UL — SIGNIFICANT CHANGE UP (ref 150–400)
POTASSIUM SERPL-MCNC: 3.8 MMOL/L — SIGNIFICANT CHANGE UP (ref 3.5–5.3)
POTASSIUM SERPL-SCNC: 3.8 MMOL/L — SIGNIFICANT CHANGE UP (ref 3.5–5.3)
PROT SERPL-MCNC: 8.1 GM/DL — SIGNIFICANT CHANGE UP (ref 6–8.3)
PROT UR-MCNC: NEGATIVE MG/DL — SIGNIFICANT CHANGE UP
PROTHROM AB SERPL-ACNC: 11.1 SEC — SIGNIFICANT CHANGE UP (ref 9.5–13)
RBC # BLD: 5.29 M/UL — SIGNIFICANT CHANGE UP (ref 4.2–5.8)
RBC # FLD: 13.1 % — SIGNIFICANT CHANGE UP (ref 10.3–14.5)
RBC CASTS # UR COMP ASSIST: 0 /HPF — SIGNIFICANT CHANGE UP (ref 0–4)
SODIUM SERPL-SCNC: 136 MMOL/L — SIGNIFICANT CHANGE UP (ref 135–145)
SP GR SPEC: 1.02 — SIGNIFICANT CHANGE UP (ref 1–1.03)
SQUAMOUS # UR AUTO: 0 /HPF — SIGNIFICANT CHANGE UP (ref 0–5)
UROBILINOGEN FLD QL: 0.2 MG/DL — SIGNIFICANT CHANGE UP (ref 0.2–1)
WBC # BLD: 11.49 K/UL — HIGH (ref 3.8–10.5)
WBC # FLD AUTO: 11.49 K/UL — HIGH (ref 3.8–10.5)
WBC UR QL: 8 /HPF — HIGH (ref 0–5)

## 2024-08-27 PROCEDURE — 76870 US EXAM SCROTUM: CPT | Mod: 26

## 2024-08-27 PROCEDURE — 85027 COMPLETE CBC AUTOMATED: CPT

## 2024-08-27 PROCEDURE — 80048 BASIC METABOLIC PNL TOTAL CA: CPT

## 2024-08-27 PROCEDURE — 36415 COLL VENOUS BLD VENIPUNCTURE: CPT

## 2024-08-27 PROCEDURE — 99223 1ST HOSP IP/OBS HIGH 75: CPT

## 2024-08-27 PROCEDURE — 99285 EMERGENCY DEPT VISIT HI MDM: CPT

## 2024-08-27 RX ORDER — ACETAMINOPHEN 325 MG/1
650 TABLET ORAL EVERY 6 HOURS
Refills: 0 | Status: DISCONTINUED | OUTPATIENT
Start: 2024-08-27 | End: 2024-08-29

## 2024-08-27 RX ORDER — SODIUM CHLORIDE 9 MG/ML
1000 INJECTION INTRAMUSCULAR; INTRAVENOUS; SUBCUTANEOUS ONCE
Refills: 0 | Status: COMPLETED | OUTPATIENT
Start: 2024-08-27 | End: 2024-08-27

## 2024-08-27 RX ORDER — L.ACIDOPH/B.ANIMALIS/B.LONGUM 15B CELL
1 CAPSULE ORAL
Refills: 0 | DISCHARGE

## 2024-08-27 RX ORDER — FOLIC ACID/MULTIVIT,IRON,MINER 0.4MG-18MG
1 TABLET,CHEWABLE ORAL
Refills: 0 | DISCHARGE

## 2024-08-27 RX ORDER — KETOROLAC TROMETHAMINE 30 MG/ML
15 INJECTION, SOLUTION INTRAMUSCULAR ONCE
Refills: 0 | Status: DISCONTINUED | OUTPATIENT
Start: 2024-08-27 | End: 2024-08-27

## 2024-08-27 RX ORDER — ONDANSETRON 2 MG/ML
4 INJECTION, SOLUTION INTRAMUSCULAR; INTRAVENOUS EVERY 8 HOURS
Refills: 0 | Status: DISCONTINUED | OUTPATIENT
Start: 2024-08-27 | End: 2024-08-29

## 2024-08-27 RX ORDER — MAGNESIUM, ALUMINUM HYDROXIDE 200-225/5
30 SUSPENSION, ORAL (FINAL DOSE FORM) ORAL EVERY 4 HOURS
Refills: 0 | Status: DISCONTINUED | OUTPATIENT
Start: 2024-08-27 | End: 2024-08-29

## 2024-08-27 RX ORDER — TAMSULOSIN HYDROCHLORIDE 0.4 MG/1
1 CAPSULE ORAL
Refills: 0 | DISCHARGE

## 2024-08-27 RX ADMIN — Medication 1000 MILLIGRAM(S): at 14:26

## 2024-08-27 RX ADMIN — KETOROLAC TROMETHAMINE 15 MILLIGRAM(S): 30 INJECTION, SOLUTION INTRAMUSCULAR at 14:08

## 2024-08-27 RX ADMIN — KETOROLAC TROMETHAMINE 15 MILLIGRAM(S): 30 INJECTION, SOLUTION INTRAMUSCULAR at 14:23

## 2024-08-27 RX ADMIN — SODIUM CHLORIDE 1000 MILLILITER(S): 9 INJECTION INTRAMUSCULAR; INTRAVENOUS; SUBCUTANEOUS at 15:08

## 2024-08-27 RX ADMIN — SODIUM CHLORIDE 1000 MILLILITER(S): 9 INJECTION INTRAMUSCULAR; INTRAVENOUS; SUBCUTANEOUS at 14:08

## 2024-08-27 NOTE — H&P ADULT - NSHPLABSRESULTS_GEN_ALL_CORE
15.4   11.49 )-----------( 198      ( 27 Aug 2024 13:29 )             46.8     08-27    136  |  103  |  15  ----------------------------<  131<H>  3.8   |  28  |  0.99    Ca    9.7      27 Aug 2024 13:29    TPro  8.1  /  Alb  4.1  /  TBili  0.8  /  DBili  x   /  AST  20  /  ALT  34  /  AlkPhos  53  08-27    ICU Vital Signs Last 24 Hrs  T(C): 36.8 (27 Aug 2024 11:53), Max: 36.8 (27 Aug 2024 11:53)  T(F): 98.2 (27 Aug 2024 11:53), Max: 98.2 (27 Aug 2024 11:53)  HR: 93 (27 Aug 2024 11:53) (93 - 93)  BP: 128/78 (27 Aug 2024 11:53) (128/78 - 128/78)  BP(mean): 92 (27 Aug 2024 11:53) (92 - 92)  ABP: --  ABP(mean): --  RR: 18 (27 Aug 2024 11:53) (18 - 18)  SpO2: 98% (27 Aug 2024 11:53) (98% - 98%)    O2 Parameters below as of 27 Aug 2024 11:53  Patient On (Oxygen Delivery Method): room air

## 2024-08-27 NOTE — H&P ADULT - HISTORY OF PRESENT ILLNESS
Date of Consult: 8/27/24    Chief complaints; Right Testicular pain    History of Present Illness:  69-year-old male  Male  with past medical history of  Prostate cancer (low grade) kidney stones, and HTN presents to the Emergency Room with complaints of  testicular pain. Patient states that this pain begun more than three (3) years ago since he did the first biopsy for evaluation of his prostate cancer. Patient states he has had two of such biopsies, the most recent one being a year ago. Patients states the pain begins gradually and radiates along the inguinal area. He rates the pain at rest as 4/10. This one initiated three days ago, patient was able to tolerate the pain and went to work the previous day but became unbearable and called his doctor, who advised him to come to the ED. Aside from chills and loss of appetite, patient denies fever, headache and shortness of breath. Patient admits to taking Tylenol, cefuroxime and Ibuprofen with little relief.  Pt allergic to doxycycline.              PMH: Hypertension    Kidney stones    Prostate cancer      PSH:S/P appendectomy    S/P tonsillectomy    Nephrostomy status        Allergies:doxycycline (Rash)      Labs:                          15.4   11.49 )-----------( 198      ( 27 Aug 2024 13:29 )             46.8     WBC Trend  11.49<H> Date (08-27 @ 13:29)      Chem  08-27    136  |  103  |  15  ----------------------------<  131<H>  3.8   |  28  |  0.99    Ca    9.7      27 Aug 2024 13:29    TPro  8.1  /  Alb  4.1  /  TBili  0.8  /  DBili  x   /  AST  20  /  ALT  34  /  AlkPhos  53  08-27          T(F): 98.2 (08-27-24 @ 11:53), Max: 98.2 (08-27-24 @ 11:53)  HR: 93 (08-27-24 @ 11:53) (93 - 93)  BP: 128/78 (08-27-24 @ 11:53) (128/78 - 128/78)  RR: 18 (08-27-24 @ 11:53) (18 - 18)  SpO2: 98% (08-27-24 @ 11:53) (98% - 98%)  Wt(kg): --    REVIEW OF SYSTEMS:    CONSTITUTIONAL: No weakness, fevers or chills  EYES: No visual changes  RESPIRATORY: No cough, wheezing; No shortness of breath  CARDIOVASCULAR: No chest pain or palpitations  GASTROINTESTINAL: No abdominal or epigastric pain. No nausea, vomiting; No diarrhea or constipation.   GENITOURINARY: No dysuria, frequency or hematuria  NEUROLOGICAL: No numbness or weakness  SKIN: See physical examination.  All other review of systems is negative unless indicated above    Physical Exam:   Constitutional: NAD, alert;  Lower Extremity Focus  Derm:  Skin warm, dry and supple bilateral.    Ulceration Right/Left ---- in nature, +/- hyperkeratotic border, wound base Granular/Fibrogranular/Necrotic patches/ , wound size (-- cm X – cm X –cm) +/- edema, +/- lanie-wound erythema, +/- purulence, +/- fluctuance, +/- tracking/tunneling, +/- probe to bone.   Vascular: Dorsalis Pedis and Posterior Tibial pulses --/4.  Capillary re-fill time less then 3 seconds digits 1-5 bilateral.    Neuro: Protective sensation intact/diminished to the level of the digits bilateral.  MSK: Muscle strength 5/5 all major muscle groups bilateral.      A: -- seen for the following:       P:   Chart reviewed and Patient evaluated;  Discussed diagnosis and treatment with patient. Discussed importance of daily foot examinations, proper shoe gear, importance of tight glycemic control.   X-rays reviewed : on wet read showing --  Wound flush with normal saline  Wound cx taken  Applied --- with dry sterile dressing  WBAT  to ------------  Offload bilateral heels while bedbound  Continue antibiotics as per ID  All additional care per Med appreciated  Patient demonstrated verbal understanding of all interventions and tolerated interventions well without any complications.   Podiatry will follow while in house      Case D/W attending  ---     Date of Consult: 8/27/24    Chief complaints; Right Testicular pain    History of Present Illness:  69-year-old male  Male  with past medical history of  Prostate cancer (low grade) kidney stones, and HTN presents to the Emergency Room with complaints of  testicular pain. Patient states that this pain begun more than three (3) years ago since he did the first biopsy for evaluation of his prostate cancer. Patient states he has had two of such biopsies, the most recent one being a year ago. Patients states the pain begins gradually and radiates along the inguinal area. He rates the pain at rest as 4/10. This one initiated three days ago, patient was able to tolerate the pain and went to work the previous day but became unbearable and called his doctor, who advised him to come to the ED. Aside from chills and loss of appetite, patient denies fever, headache and shortness of breath. Patient admits to taking Tylenol, cefuroxime and Ibuprofen with little relief.  Pt allergic to doxycycline.              Past Medical History:  Hypertension    Kidney stones    Prostate cancer      PSH:S/P appendectomy    S/P tonsillectomy    Nephrostomy status        Allergies: doxycycline (Rash)      Labs:                          15.4   11.49 )-----------( 198      ( 27 Aug 2024 13:29 )             46.8     WBC Trend  11.49<H> Date (08-27 @ 13:29)      Chem  08-27    136  |  103  |  15  ----------------------------<  131<H>  3.8   |  28  |  0.99    Ca    9.7      27 Aug 2024 13:29    TPro  8.1  /  Alb  4.1  /  TBili  0.8  /  DBili  x   /  AST  20  /  ALT  34  /  AlkPhos  53  08-27          T(F): 98.2 (08-27-24 @ 11:53), Max: 98.2 (08-27-24 @ 11:53)  HR: 93 (08-27-24 @ 11:53) (93 - 93)  BP: 128/78 (08-27-24 @ 11:53) (128/78 - 128/78)  RR: 18 (08-27-24 @ 11:53) (18 - 18)  SpO2: 98% (08-27-24 @ 11:53) (98% - 98%)  Wt(kg): --    REVIEW OF SYSTEMS:    CONSTITUTIONAL: No weakness, fevers or chills  EYES: No visual changes  RESPIRATORY: No cough, wheezing; No shortness of breath  CARDIOVASCULAR: No chest pain or palpitations  GASTROINTESTINAL: No abdominal or epigastric pain. No nausea, vomiting; No diarrhea or constipation.   GENITOURINARY: No dysuria, frequency or hematuria  NEUROLOGICAL: No numbness or weakness  SKIN: See physical examination.  All other review of systems is negative unless indicated above          PHYSICAL EXAM:   GENERAL: A&Ox4, non-toxic appearing, no acute distress  HEENT: NCAT, Extra-ocular muscles intact, oral mucosa moist, normal conjunctiva  RESP: CTAB, no respiratory distress, no wheezes/rhonchi/rales, speaking in full sentences  CV: Regular respiratory rate and rythm  S1/S2 , no murmurs/rubs/gallops  ABDOMEN: soft, non-tender, non-distended, no guarding, no rebound tenderness  : Right testicular swelling with significant tenderness, normal left cremasteric reflex, absent to reduced right cremasteric reflex, no scrotal swelling or edema  MSK: no visible deformities  NEURO: no focal sensory or motor deficits, CN 2-12 grossly intact  SKIN: warm, normal color, well perfused, no rash  PSYCH: normal affect              A: -- seen for the following:       P:   Chart reviewed and Patient evaluated;  Discussed diagnosis and treatment with patient. Discussed importance of daily foot examinations, proper shoe gear, importance of tight glycemic control.   X-rays reviewed : on wet read showing --  Wound flush with normal saline  Wound cx taken  Applied --- with dry sterile dressing  WBAT  to ------------  Offload bilateral heels while bedbound  Continue antibiotics as per ID  All additional care per Med appreciated  Patient demonstrated verbal understanding of all interventions and tolerated interventions well without any complications.   Podiatry will follow while in house      Case D/W attending  ---     Date of Consult: 8/27/24    Chief complaints; Right Testicular pain    History of Present Illness:  69-year-old male  with past medical history of  Prostate cancer (low grade) kidney stones, and HTN presents to the Emergency Room with complaints of  testicular pain. Patient states that this pain begun more than three (3) years ago since he did the first biopsy for evaluation of his prostate cancer. Patient states he has had two of such biopsies, the most recent one being a year ago. Patients states the pain begins gradually and radiates along the inguinal area. He rates the pain at rest as 4/10. This one initiated three days ago, patient was able to tolerate the pain and went to work the previous day but became unbearable and called his doctor, who advised him to come to the ED. Aside from chills and loss of appetite, patient denies fever, headache and shortness of breath. Patient admits to taking Tylenol, cefuroxime and Ibuprofen with little relief.  Pt allergic to doxycycline.     REVIEW OF SYSTEMS:    CONSTITUTIONAL: No weakness, fevers or chills  EYES: No visual changes  RESPIRATORY: No cough, wheezing; No shortness of breath  CARDIOVASCULAR: No chest pain or palpitations  GASTROINTESTINAL: No abdominal or epigastric pain. No nausea, vomiting; No diarrhea or constipation.   GENITOURINARY: No dysuria, frequency or hematuria  NEUROLOGICAL: No numbness or weakness  SKIN: See physical examination.  All other review of systems is negative unless indicated above        Goals of Care:         GOALS OF CARE:                    Participants	        Patient         Location of Discussion:                · Location of discussion	     Face to face      Patient History:    Social History:  · Substance use	Unable to obtain  · Social History (marital status, living situation, occupation, and sexual history)	Formal smoker (Quits 40 years ago)      Tobacco Screening:  · Core Measure Site	Yes  · Has the patient used tobacco in the past 30 days?	No        Physical Exam:   Physical Exam: Physical Examination:  General: Mild respiratory distress   HEENT: Pupils equal, reactive to light.  Symmetric  PULM: Crackles noted auscultating over R lung   CVS: Irregularly irregular rate and rhythm, no murmurs, rubs, or gallops  ABD: Soft, nondistended, nontender  EXT: No edema, nontender  SKIN: Warm and well perfused  NEURO: A&Ox3      Past Medical History:  Hypertension    Kidney stones    Prostate cancer      PSH:S/P appendectomy    S/P tonsillectomy    Nephrostomy status        Allergies: doxycycline (Rash)      Labs:                          15.4   11.49 )-----------( 198      ( 27 Aug 2024 13:29 )             46.8     WBC Trend  11.49<H> Date (08-27 @ 13:29)      Chem  08-27    136  |  103  |  15  ----------------------------<  131<H>  3.8   |  28  |  0.99    Ca    9.7      27 Aug 2024 13:29    TPro  8.1  /  Alb  4.1  /  TBili  0.8  /  DBili  x   /  AST  20  /  ALT  34  /  AlkPhos  53  08-27          T(F): 98.2 (08-27-24 @ 11:53), Max: 98.2 (08-27-24 @ 11:53)  HR: 93 (08-27-24 @ 11:53) (93 - 93)  BP: 128/78 (08-27-24 @ 11:53) (128/78 - 128/78)  RR: 18 (08-27-24 @ 11:53) (18 - 18)  SpO2: 98% (08-27-24 @ 11:53) (98% - 98%)  Wt(kg): --              PHYSICAL EXAM:   GENERAL: A&Ox4, non-toxic appearing, no acute distress  HEENT: NCAT, Extra-ocular muscles intact, oral mucosa moist, normal conjunctiva  RESP: CTAB, no respiratory distress, no wheezes/rhonchi/rales, speaking in full sentences  CV: Regular respiratory rate and rythm  S1/S2 , no murmurs/rubs/gallops  ABDOMEN: soft, non-tender, non-distended, no guarding, no rebound tenderness  : Right testicular swelling with significant tenderness, normal left cremasteric reflex, absent to reduced right cremasteric reflex, no scrotal swelling or edema  MSK: no visible deformities  NEURO: no focal sensory or motor deficits, CN 2-12 grossly intact  SKIN: warm, normal color, well perfused, no rash  PSYCH: normal affect     Date of Consult: 8/27/24    Chief complaints; Right Testicular pain    History of Present Illness:  69-year-old male  with past medical history of  Prostate cancer (low grade) kidney stones, and HTN presents to the Emergency Room with complaints of  testicular pain. Patient states that this pain begun more than three (3) years ago since he did the first biopsy for evaluation of his prostate cancer. Patient states he has had two of such biopsies, the most recent one being a year ago. Patients states the pain begins gradually and radiates along the inguinal area. He rates the pain at rest as 4/10. This one initiated three days ago, patient was able to tolerate the pain and went to work the previous day but became unbearable and called his doctor, who advised him to come to the ED. Aside from chills and loss of appetite, patient denies fever, headache and shortness of breath. Patient admits to taking Tylenol, cefuroxime and Ibuprofen with little relief.  Pt allergic to doxycycline.        Past Medical History:  Hypertension    Kidney stones    Prostate cancer      PSH:S/P appendectomy    S/P tonsillectomy    Nephrostomy status       REVIEW OF SYSTEMS:    CONSTITUTIONAL: No weakness, fevers or chills  EYES: No visual changes  RESPIRATORY: No cough, wheezing; No shortness of breath  CARDIOVASCULAR: No chest pain or palpitations  GASTROINTESTINAL: No abdominal or epigastric pain. No nausea, vomiting; No diarrhea or constipation.   GENITOURINARY: No dysuria, frequency or hematuria  NEUROLOGICAL: No numbness or weakness  SKIN: See physical examination.  All other review of systems is negative unless indicated above        Goals of Care:         GOALS OF CARE:                    Participants	        Patient         Location of Discussion:                · Location of discussion	     Face to face      Patient History:    Social History:  · Substance use	Unable to obtain  · Social History (marital status, living situation, occupation, and sexual history)	Formal smoker (Quits 40 years ago)      Tobacco Screening:  · Core Measure Site	Yes  · Has the patient used tobacco in the past 30 days?	No        Physical Exam:   Physical Exam: Physical Examination:  General: Mild respiratory distress   HEENT: Pupils equal, reactive to light.  Symmetric  PULM: Crackles noted auscultating over R lung   CVS: Irregularly irregular rate and rhythm, no murmurs, rubs, or gallops  ABD: Soft, nondistended, nontender  EXT: No edema, nontender  SKIN: Warm and well perfused  NEURO: A&Ox3          Allergies: doxycycline (Rash)      Labs:                          15.4   11.49 )-----------( 198      ( 27 Aug 2024 13:29 )             46.8     WBC Trend  11.49<H> Date (08-27 @ 13:29)      Chem  08-27    136  |  103  |  15  ----------------------------<  131<H>  3.8   |  28  |  0.99    Ca    9.7      27 Aug 2024 13:29    TPro  8.1  /  Alb  4.1  /  TBili  0.8  /  DBili  x   /  AST  20  /  ALT  34  /  AlkPhos  53  08-27          T(F): 98.2 (08-27-24 @ 11:53), Max: 98.2 (08-27-24 @ 11:53)  HR: 93 (08-27-24 @ 11:53) (93 - 93)  BP: 128/78 (08-27-24 @ 11:53) (128/78 - 128/78)  RR: 18 (08-27-24 @ 11:53) (18 - 18)  SpO2: 98% (08-27-24 @ 11:53) (98% - 98%)  Wt(kg): --              PHYSICAL EXAM:   GENERAL: A&Ox4, non-toxic appearing, no acute distress  HEENT: NCAT, Extra-ocular muscles intact, oral mucosa moist, normal conjunctiva  RESP: CTAB, no respiratory distress, no wheezes/rhonchi/rales, speaking in full sentences  CV: Regular respiratory rate and rythm  S1/S2 , no murmurs/rubs/gallops  ABDOMEN: soft, non-tender, non-distended, no guarding, no rebound tenderness  : Right testicular swelling with significant tenderness, normal left cremasteric reflex, absent to reduced right cremasteric reflex, no scrotal swelling or edema  MSK: no visible deformities  NEURO: no focal sensory or motor deficits, CN 2-12 grossly intact  SKIN: warm, normal color, well perfused, no rash  PSYCH: normal affect     Date of Consult: 8/27/24    Chief complaints; Right Testicular pain    History of Present Illness:  69-year-old male  with past medical history of  Prostate cancer (low grade) kidney stones, and HTN presents to the Emergency Room with complaints of  testicular pain. Patient states that this pain begun more than three (3) years ago since he did the first biopsy for evaluation of his prostate cancer. Patient states he has had two of such biopsies, the most recent one being a year ago. Patients states the pain begins gradually and radiates along the inguinal area. He rates the pain at rest as 4/10. This one initiated three days ago, patient was able to tolerate the pain and went to work the previous day but became unbearable and called his doctor, who advised him to come to the ED. Aside from chills and loss of appetite, patient denies fever, headache and shortness of breath. Patient admits to taking Tylenol, cefuroxime and Ibuprofen with little relief.  Pt allergic to doxycycline.        Past Medical History:  Hypertension    Kidney stones    Prostate cancer      PSH:S/P appendectomy    S/P tonsillectomy    Nephrostomy status            Allergies: doxycycline (Rash)      Labs:                          15.4   11.49 )-----------( 198      ( 27 Aug 2024 13:29 )             46.8     WBC Trend  11.49<H> Date (08-27 @ 13:29)      Chem  08-27    136  |  103  |  15  ----------------------------<  131<H>  3.8   |  28  |  0.99    Ca    9.7      27 Aug 2024 13:29    TPro  8.1  /  Alb  4.1  /  TBili  0.8  /  DBili  x   /  AST  20  /  ALT  34  /  AlkPhos  53  08-27          T(F): 98.2 (08-27-24 @ 11:53), Max: 98.2 (08-27-24 @ 11:53)  HR: 93 (08-27-24 @ 11:53) (93 - 93)  BP: 128/78 (08-27-24 @ 11:53) (128/78 - 128/78)  RR: 18 (08-27-24 @ 11:53) (18 - 18)  SpO2: 98% (08-27-24 @ 11:53) (98% - 98%)  Wt(kg): --              PHYSICAL EXAM:   GENERAL: A&Ox4, non-toxic appearing, no acute distress  HEENT: NCAT, Extra-ocular muscles intact, oral mucosa moist, normal conjunctiva  RESP: CTAB, no respiratory distress, no wheezes/rhonchi/rales, speaking in full sentences  CV: Regular respiratory rate and rhythm  S1/S2 , no murmurs/rubs/gallops  ABDOMEN: soft, non-tender, non-distended, no guarding, no rebound tenderness  : Right testicular swelling with significant tenderness, normal left cremasteric reflex, absent to reduced right cremasteric reflex, no scrotal swelling or edema  MSK: no visible deformities  NEURO: no focal sensory or motor deficits, CN 2-12 grossly intact  SKIN: warm, normal color, well perfused, no rash  PSYCH: normal affect     Date of Consult: 8/27/24    Chief complaints; Right Testicular pain    History of Present Illness:  69-year-old male  with past medical history of  Prostate cancer (low grade) kidney stones, and HTN presents to the Emergency Room with complaints of  testicular pain. Patient states that this pain begun more than three (3) years ago since he did the first biopsy for evaluation of his prostate cancer. Patient states he has had two of such biopsies, the most recent one being a year ago. Patients states the pain begins gradually and radiates along the inguinal area. He rates the pain at rest as 4/10. This one initiated three days ago, patient was able to tolerate the pain and went to work the previous day but became unbearable and called his doctor, who advised him to come to the ED. Aside from chills and loss of appetite, patient denies fever, headache and shortness of breath. Patient admits to taking Tylenol, cefuroxime and Ibuprofen with little relief.  Pt allergic to doxycycline.        Past Medical History:  Hypertension    Kidney stones    Prostate cancer      PSH:S/P appendectomy    S/P tonsillectomy    Nephrostomy status

## 2024-08-27 NOTE — ED STATDOCS - WET READ LAUNCH FT
OT Evaluation-General/PLF


Medical Diagnosis


Admission Date


Dusty 15, 2022 at 18:08


Medical Diagnosis:  AFib with RVR


Onset Date:  Dusty 15, 2022





Therapy Diagnosis


Therapy Diagnosis:  n/a





Height/Weight


Height (Feet):  5


Height (Inches):  0.00


Weight (Pounds):  140


Weight (Ounces):  0.0





Precautions


Precautions/Isolations:  Standard Precautions





Referral


Physician:  David


Referral Reason:  Evaluation/Treatment





Medical History


Pertinent Medical History:  Atrial Fib, CAD, COPD, PVD


Additional Medical History


anxiety


Current History


ED due to SOB and palpitations





Social History


Home:  Single Level


Current Living Status:  Children





ADL-Prior Level of Function


SCALE: Activities may be completed with or without assistive devices.





6-Indepedent-patient completes the activity by him/herself with no assistance 

from a helper.


5-Set-up or Clean-up Assistance-helper sets up or cleans up; patient completes 

activity. Faxon assists only prior to or  


    following the activity.


4-Supervision or Touching Assistance-helper provides verbal cues and/or 

touching/steadying and/or contact guard assistance as patient completes 

activity. Assistance may be provided   


    throughout the activity or intermittently.


3-Partial/Moderate Assistance-helper does LESS THAN HALF the effort. Faxon 

lifts, holds or supports trunk or limbs, but provides less than half the effort.


2-Substantial/Maximal Assistance-helper does MORE THAN HALF the effort. Faxon 

lifts or holds trunk or limbs and provides more than half the effort.


8-Benyvhbic-xwchhq does ALL the effort. Patient does none of the effort to c

omplete the activity. Or, the assistance of 2 or more helpers is required for 

the patient to complete the  


    activity.


If activity was not attempted, code reason:


7-Patient Refused.


9-Not Applicable-not attempted and the patient did not perform the activity 

before the current illness, exacerbation or injury.


10-Not Attempted due to Environmental Limitations-(lack of equipment, weather 

restraints, etc.).


88-Not Attempted due to Medical Conditions or Safety Concerns.


ADL PLOF Comments


Pt reports IND with ADLs and functional mobility, no AD/AE


Self Care:  Independent


Functional Cognition:  Independent





OT Current Status


Subjective


Pt in recliner, agreeable to OT Tx. Pt states she is ready to go home and feels 

like she is back to her baseline.





Mental Status/Objective


Patient Orientation:  Person, Place, Time, Situation





Current


Upper Extremity ROM


WFL





ADL-Treatment


Eating (QC):  6 (Per pt report.)


Lower Body Dressing (QC):  6 (IND per clinical judgment.)


On/Off Footwear (QC):  6 (IND with footwear)


Toileting Hygiene (QC):  6 (IND per pt report.)





Other Treatments


Pt in recliner, states she is hoping to discharge today. Pt provided information

about PLOF and home set up. Pt able to doff/don slip on shoes and gripper socks,

then stood from chair, ambulated to the door and back to the chair, 

independently. Pt slightly limping with L leg, states this is her "bum" leg and 

has been this way for many years. Post tx, pt in recliner, call light in reach 

and all needs met.





Education


OT Patient Education:  Correct positioning, Modified ADL techniques, Progress 

toward Goal/Update tx plan, Purpose of tx/functional activities, Rehab process


Teaching Recipient:  Patient


Teaching Methods:  Discussion


Response to Teaching:  Verbalize Understanding





OT Long Term Goals


Long Term Goals


1=Demonstrate adherence to instructed precautions during ADL tasks.


2=Patient will verbalize/demonstrate understanding of assistive 

devices/modifications for ADL.


3=Patient will improve strength/tolerance for activity to enable patient to 

perform ADL's.





OT Education/Plan


Problem List/Assessment


Assessment:  No Skilled OT Needs ID'd


No skilled OT services indicated at this time, as pt is at her PLOF and IND with

ADLs.





Discharge Recommendations


Plan/Recommendations:  Discharge/Goals Met





Treatment Plan/Plan of Care


Patient would benefit from OT for education, treatment and training to promote 

independence in ADL's, mobility, safety and/or upper extremity function for 

ADL's.


Plan of Care:  ADL Retraining, Functional Mobility


Treatment Duration:  Jan 17, 2022


Frequency:  1 time per week (eval only)


Estimated Hrs Per Day:  .25 hour per day


Rehab Potential:  Good





Time/GCodes


Start Time:  10:40


Stop Time:  10:48


Total Time Billed (hr/min):  8


Billed Treatment Time


1, EVL











CRUMPACKER,ULI OT          Jan 17, 2022 11:40 There are no Wet Read(s) to document.

## 2024-08-27 NOTE — PHARMACOTHERAPY INTERVENTION NOTE - COMMENTS
Medication reconciliation completed.  Reviewed Medication list and confirmed med allergies with patient; confirmed with Dr. First Medmazin.

## 2024-08-27 NOTE — ED STATDOCS - OBJECTIVE STATEMENT
69 year-old male with past medical history of prostate cancer, kidney stones, and HTN presents to the ed c/o testicular pain. Pt endorses fever and chills, took ibuprofen yesterday with little relief. Pt allergic to doxycycline.

## 2024-08-27 NOTE — ED STATDOCS - PROGRESS NOTE DETAILS
NIKIA Painter: US and labs reviewed and discussed results with pt. rocephine ordered. Leslye Ortiz sent pt in to be admitted to medicine with IV abx. pt agrees with plan. d/w hospitalists Dr. Camejo, and accepted pt. NIKIA Painter: I participated in the care of this patient. I agree with the history, physical and plan.

## 2024-08-27 NOTE — ED STATDOCS - CLINICAL SUMMARY MEDICAL DECISION MAKING FREE TEXT BOX
69-year-old male presenting with 2 days of right-sided testicular pain.  Was treated recently for epididymitis, finished antibiotics.  No trauma.  Physical exam concerning for torsion versus orchitis versus epididymitis.  Plan for labs, UA, ultrasound, consider antibiotics.  Patient was referred here by urology, likely to require admission.

## 2024-08-27 NOTE — H&P ADULT - NSHPPHYSICALEXAM_GEN_ALL_CORE
Physical Exam: Physical Examination:  General: Mild respiratory distress   HEENT: Pupils equal, reactive to light.  Symmetric  PULM: Crackles noted auscultating over R lung   CVS: Irregularly irregular rate and rhythm, no murmurs, rubs, or gallops  ABD: Soft, nondistended, nontender  EXT: No edema, nontender  SKIN: Warm and well perfused  NEURO: A&Ox3

## 2024-08-27 NOTE — ED STATDOCS - PHYSICAL EXAMINATION
GENERAL: A&Ox4, non-toxic appearing, no acute distress  HEENT: NCAT, EOMI, oral mucosa moist, normal conjunctiva  RESP: CTAB, no respiratory distress, no wheezes/rhonchi/rales, speaking in full sentences  CV: RRR, no murmurs/rubs/gallops  ABDOMEN: soft, non-tender, non-distended, no guarding, no rebound tenderness  : Right testicular swelling with significant tenderness, normal left cremasteric reflex, absent to reduced right cremasteric reflex, no scrotal swelling or edema  MSK: no visible deformities  NEURO: no focal sensory or motor deficits, CN 2-12 grossly intact  SKIN: warm, normal color, well perfused, no rash  PSYCH: normal affect

## 2024-08-27 NOTE — ED ADULT TRIAGE NOTE - CHIEF COMPLAINT QUOTE
PT presents to the ED for testicular pain x 2 days. pt denies injury. pt has history of intermittent testicular pain but denies hx of testicular torsion. no other complaints at this time.

## 2024-08-27 NOTE — H&P ADULT - NSHPREVIEWOFSYSTEMS_GEN_ALL_CORE
[place] CONSTITUTIONAL: No weakness, fevers or chills  EYES: No visual changes  RESPIRATORY: No cough, wheezing; No shortness of breath  CARDIOVASCULAR: No chest pain or palpitations  GASTROINTESTINAL: No abdominal or epigastric pain. No nausea, vomiting; No diarrhea or constipation.   GENITOURINARY: No dysuria, frequency or hematuria  NEUROLOGICAL: No numbness or weakness  SKIN: See physical examination.  All other review of systems is negative unless indicated above

## 2024-08-28 LAB
ANION GAP SERPL CALC-SCNC: 6 MMOL/L — SIGNIFICANT CHANGE UP (ref 5–17)
BUN SERPL-MCNC: 16 MG/DL — SIGNIFICANT CHANGE UP (ref 7–23)
CALCIUM SERPL-MCNC: 9 MG/DL — SIGNIFICANT CHANGE UP (ref 8.5–10.1)
CHLORIDE SERPL-SCNC: 108 MMOL/L — SIGNIFICANT CHANGE UP (ref 96–108)
CO2 SERPL-SCNC: 26 MMOL/L — SIGNIFICANT CHANGE UP (ref 22–31)
CREAT SERPL-MCNC: 0.94 MG/DL — SIGNIFICANT CHANGE UP (ref 0.5–1.3)
CULTURE RESULTS: NO GROWTH — SIGNIFICANT CHANGE UP
EGFR: 88 ML/MIN/1.73M2 — SIGNIFICANT CHANGE UP
GLUCOSE SERPL-MCNC: 99 MG/DL — SIGNIFICANT CHANGE UP (ref 70–99)
HCT VFR BLD CALC: 42.4 % — SIGNIFICANT CHANGE UP (ref 39–50)
HGB BLD-MCNC: 13.8 G/DL — SIGNIFICANT CHANGE UP (ref 13–17)
MCHC RBC-ENTMCNC: 29.1 PG — SIGNIFICANT CHANGE UP (ref 27–34)
MCHC RBC-ENTMCNC: 32.5 GM/DL — SIGNIFICANT CHANGE UP (ref 32–36)
MCV RBC AUTO: 89.5 FL — SIGNIFICANT CHANGE UP (ref 80–100)
PLATELET # BLD AUTO: 174 K/UL — SIGNIFICANT CHANGE UP (ref 150–400)
POTASSIUM SERPL-MCNC: 3.7 MMOL/L — SIGNIFICANT CHANGE UP (ref 3.5–5.3)
POTASSIUM SERPL-SCNC: 3.7 MMOL/L — SIGNIFICANT CHANGE UP (ref 3.5–5.3)
RBC # BLD: 4.74 M/UL — SIGNIFICANT CHANGE UP (ref 4.2–5.8)
RBC # FLD: 13.1 % — SIGNIFICANT CHANGE UP (ref 10.3–14.5)
SODIUM SERPL-SCNC: 140 MMOL/L — SIGNIFICANT CHANGE UP (ref 135–145)
SPECIMEN SOURCE: SIGNIFICANT CHANGE UP
WBC # BLD: 11.62 K/UL — HIGH (ref 3.8–10.5)
WBC # FLD AUTO: 11.62 K/UL — HIGH (ref 3.8–10.5)

## 2024-08-28 PROCEDURE — 99232 SBSQ HOSP IP/OBS MODERATE 35: CPT

## 2024-08-28 PROCEDURE — 99222 1ST HOSP IP/OBS MODERATE 55: CPT

## 2024-08-28 RX ORDER — HEPARIN SODIUM,BOVINE 1000/ML
5000 VIAL (ML) INJECTION EVERY 12 HOURS
Refills: 0 | Status: DISCONTINUED | OUTPATIENT
Start: 2024-08-28 | End: 2024-08-29

## 2024-08-28 RX ORDER — LISINOPRIL 10 MG/1
20 TABLET ORAL DAILY
Refills: 0 | Status: DISCONTINUED | OUTPATIENT
Start: 2024-08-28 | End: 2024-08-29

## 2024-08-28 RX ADMIN — ACETAMINOPHEN 650 MILLIGRAM(S): 325 TABLET ORAL at 10:23

## 2024-08-28 RX ADMIN — LISINOPRIL 20 MILLIGRAM(S): 10 TABLET ORAL at 13:15

## 2024-08-28 RX ADMIN — Medication 1000 MILLIGRAM(S): at 14:15

## 2024-08-28 RX ADMIN — ACETAMINOPHEN 650 MILLIGRAM(S): 325 TABLET ORAL at 18:42

## 2024-08-28 RX ADMIN — Medication 5000 UNIT(S): at 21:14

## 2024-08-28 NOTE — PROGRESS NOTE ADULT - ASSESSMENT
69-year-old male  with past medical history of  Prostate cancer (low grade) kidney stones, and HTN presents to the Emergency Room with complaints of  testicular pain. Admitted for:     #Right epididymitis   #Small left hydrocele   #UA suggestive of UTI  #Mild leukocytosis   - failed outpatient treatment with cefuroxime and OTC pain meds   - US testicles as above   - UA with small LEs, 8 WBCs  - Continue IV CTX   - Urology consulted, appreciate recs, likely plan for 10 days of IV abx    - ID consulted to assist  - F/u BCx, UCx  - pain control  - Will need outpatient f/u with Dr. Gavin urology upon discharge     #HTN  - lisinopril     #Hx Prostate cancer   - outpatient f/u     #DVT ppx  - heparin SQ
A: 69-year-old male seen for the following:     Problem 1: Right Epididymitis (Ultrasound 8/27/24)          Plan:                  -  IV antibiotics                  - IV fluids                 - Urology consult placed    Problem 2 # small Left Hydrocele ( Ultrasound 8/27/2024)           Plan:   IV antibiotics                  - IV fluids                 - Urology consult placed    Problem #3 Urinary tract Infection         Plan:                - Urine culture               - IV antibiotics    Problem # 4 Kidney stones               Plan:                  - Ultrasound                 - Urology consult     Problem #5: Prostate cancer         Plan:  Prostate-specific antigen test                 - Oncology consult     Problem #6 : HTN               Plan: /82                 - Not on active medication

## 2024-08-28 NOTE — CONSULT NOTE ADULT - SUBJECTIVE AND OBJECTIVE BOX
69-year-old male  with past medical history of  Prostate cancer (low grade) kidney stones, and HTN presents to the Emergency Room with complaints of  testicular pain. Patient states that this pain begun more than three (3) years ago since he did the first biopsy for evaluation of his prostate cancer. Patient states he has had two of such biopsies, the most recent one being a year ago. Patients states the pain begins gradually and radiates along the inguinal area. He rates the pain at rest as 4/10. This one initiated three days ago, patient was able to tolerate the pain and went to work the previous day but became unbearable and called his doctor, who advised him to come to the ED. Aside from chills and loss of appetite, patient denies fever, headache and shortness of breath. Patient admits to taking Tylenol, cefuroxime and Ibuprofen with little relief.  Pt allergic to doxycycline.    Called to see this pt known to Dr. Gavni who failed oral antibiotics as an outpatient for this Right Epididymitis.  Pt denies fever, chills, hematuria or any other complaints     Patient History:    Past Medical, Past Surgical, and Family History:  PAST MEDICAL HISTORY:  Hypertension     Kidney stones     Prostate cancer.     PAST SURGICAL HISTORY:  Nephrostomy status rt nephrostomy tube last year    S/P appendectomy     S/P tonsillectomy.      Allergies and Intolerances:        Allergies:  	doxycycline: Drug, Rash    Home Medications:   * Patient Currently Takes Medications as of 07-Aug-2023 00:05 documented in Structured Notes  · 	predniSONE 20 mg oral tablet: 2 tab(s) orally once a day  · 	doxycycline hyclate 100 mg oral capsule: 1 cap(s) orally 2 times a day  · 	doxycycline hyclate 100 mg oral capsule: 1 cap(s) orally 2 times a day  · 	lisinopril-hydrochlorothiazide 20 mg-12.5 mg oral tablet: 1 orally     Social History:  · Substance use	No  · Social History (marital status, living situation, occupation, and sexual history)	Former smoker (Quits 40 years ago)     Tobacco Screening:  · Core Measure Site	Yes  · Has the patient used tobacco in the past 30 days?	No    Risk Assessment:    Present on Admission:  Deep Venous Thrombosis	no   Pulmonary Embolus	no   Urinary Catheter	no  Central Venous Catheter/PICC Line	no   Surgical Site Incision	no   Pressure Ulcer(s)	no     Hepatitis C Test Questions:  · In accordance with [place] State Law, we offer every patient a Hepatitis C test. Would you like to be tested today?	Opt out    Review of Systems:  Review of Systems: CONSTITUTIONAL: No weakness, fevers or chills  EYES: No visual changes  RESPIRATORY: No cough, wheezing; No shortness of breath  CARDIOVASCULAR: No chest pain or palpitations  GASTROINTESTINAL: No abdominal or epigastric pain. No nausea, vomiting; No diarrhea or constipation.   GENITOURINARY: No dysuria, frequency or hematuria  NEUROLOGICAL: No numbness or weakness  SKIN: See physical examination.  All other review of systems is negative unless indicated above      Physical Exam:   Vital Signs Last 24 Hrs  T(C): 36.8 (28 Aug 2024 09:15), Max: 37.3 (27 Aug 2024 23:33)  T(F): 98.2 (28 Aug 2024 09:15), Max: 99.1 (27 Aug 2024 23:33)  HR: 77 (28 Aug 2024 09:15) (72 - 90)  BP: 112/82 (28 Aug 2024 09:15) (103/71 - 139/77)  BP(mean): 92 (27 Aug 2024 18:49) (85 - 92)  RR: 17 (28 Aug 2024 09:15) (16 - 18)  SpO2: 96% (28 Aug 2024 09:15) (94% - 100%)    Parameters below as of 28 Aug 2024 09:15  Patient On (Oxygen Delivery Method): room air      General: NAD  Head;  NC/AT  Neck:  Supple/ symmetrical  Lungs:  CTA bilat, no rales, rhonchi or wheezes  CVS: Irregularly irregular rate and rhythm, no murmurs, rubs, or gallops  ABD: Soft, NT/ND, +BS, no bladder palp  Back:  No CVA tenderness  :  Pt is circumcised, Tender at right testicle, some swelling noted. Left testicle: no tenderness  Lower Ext: No calf tenderness  SKIN: Warm and dry  NEURO: A&Ox3      LABS:                          13.8   11.62 )-----------( 174      ( 28 Aug 2024 06:39 )             42.4     08-28    140  |  108  |  16  ----------------------------<  99  3.7   |  26  |  0.94    Ca    9.0      28 Aug 2024 06:39    TPro  8.1  /  Alb  4.1  /  TBili  0.8  /  DBili  x   /  AST  20  /  ALT  34  /  AlkPhos  53  08-27    LIVER FUNCTIONS - ( 27 Aug 2024 13:29 )  Alb: 4.1 g/dL / Pro: 8.1 gm/dL / ALK PHOS: 53 U/L / ALT: 34 U/L / AST: 20 U/L / GGT: x           PT/INR - ( 27 Aug 2024 13:29 )   PT: 11.1 sec;   INR: 0.98 ratio         PTT - ( 27 Aug 2024 13:29 )  PTT:27.4 sec  Urinalysis Basic - ( 28 Aug 2024 06:39 )    Color: x / Appearance: x / SG: x / pH: x  Gluc: 99 mg/dL / Ketone: x  / Bili: x / Urobili: x   Blood: x / Protein: x / Nitrite: x   Leuk Esterase: x / RBC: x / WBC x   Sq Epi: x / Non Sq Epi: x / Bacteria: x      ACC: [id number] EXAM:  US SCROTUM AND CONTENTS   ORDERED BY: VINNIE FUNES     PROCEDURE DATE:  08/27/2024          INTERPRETATION:  CLINICAL INFORMATION: Right testicular pain and swelling.    COMPARISON: Scrotal ultrasound 8/16/2024. CT abdomen/pelvis 2/24/2022.    TECHNIQUE: Testicular ultrasound utilizing color and spectral Doppler.    FINDINGS:    RIGHT:  Right testis: 3.5 cm x 3.4 cm x 2.6 cm. Normal echogenicity and   echotexture with no masses or areas of architectural distortion. Normal   arterial and venous blood flow pattern.  Right epididymis: Heterogeneous, enlarged, and increased vascularity.  Right hydrocele: None.  Right varicocele: None.      LEFT:  Left testis: 2.6 cm x 4.0 cm x 2.7 cm. Normal echogenicity and   echotexture with no masses or areas of architectural distortion. Normal   arterial and venous blood flow pattern.  Left epididymis: Within normal limits.  Left hydrocele: Small.  Left varicocele: None.      IMPRESSION:    Right epididymitis.    Small left hydrocele.

## 2024-08-28 NOTE — PROGRESS NOTE ADULT - SUBJECTIVE AND OBJECTIVE BOX
HOSPITALIST PROGRESS NOTE    Date of Service: 8/28/24    Chief Complaints: Right Testicular Pain  HPI: 69-year-old male  with past medical history of  Prostate cancer (low grade) kidney stones, and HTN presents to the Emergency Room with complaints of  testicular pain. Patient states that this pain begun more than three (3) years ago since he did the first biopsy for evaluation of his prostate cancer. Patient states he has had two of such biopsies, the most recent one being a year ago. Patients states the pain begins gradually and radiates along the inguinal area. He rates the pain at rest as 4/10. This one initiated three days ago, patient was able to tolerate the pain and went to work the previous day but the pain became unbearable and called his doctor, who advised him to come to the ED. Aside from chills and loss of appetite, patient denies fever, headache and shortness of breath. Patient admits to taking Tylenol, cefuroxime and Ibuprofen with little pain relief.  Pt allergic to doxycycline. (8/27/2024)    On assessment today ....         Past Medical History:  Hypertension    Kidney stones    Prostate cancer      PSH: S/P appendectomy    S/P tonsillectomy    Nephrostomy status       (27 Aug 2024 15:07)            Allergies: doxycycline (Rash)      Labs:                          13.8   11.62 )-----------( 174      ( 28 Aug 2024 06:39 )             42.4     WBC Trend  11.62<H> Date (08-28 @ 06:39)  11.49<H> Date (08-27 @ 13:29)      Chem  08-28    140  |  108  |  16  ----------------------------<  99  3.7   |  26  |  0.94    Ca    9.0      28 Aug 2024 06:39    TPro  8.1  /  Alb  4.1  /  TBili  0.8  /  DBili  x   /  AST  20  /  ALT  34  /  AlkPhos  53  08-27          T(F): 98.2 (08-28-24 @ 09:15), Max: 99.1 (08-27-24 @ 23:33)  HR: 77 (08-28-24 @ 09:15) (72 - 93)  BP: 112/82 (08-28-24 @ 09:15) (103/71 - 139/77)  RR: 17 (08-28-24 @ 09:15) (16 - 18)  SpO2: 96% (08-28-24 @ 09:15) (94% - 100%)  Wt(kg): --      MEDICATIONS  (STANDING):    MEDICATIONS  (PRN):  acetaminophen     Tablet .. 650 milliGRAM(s) Oral every 6 hours PRN Temp greater or equal to 38C (100.4F), Mild Pain (1 - 3)  aluminum hydroxide/magnesium hydroxide/simethicone Suspension 30 milliLiter(s) Oral every 4 hours PRN Dyspepsia  melatonin 3 milliGRAM(s) Oral at bedtime PRN Insomnia  ondansetron Injectable 4 milliGRAM(s) IV Push every 8 hours PRN Nausea and/or Vomiting          REVIEW OF SYSTEMS:    CONSTITUTIONAL: No weakness, fevers or chills  EYES: No visual changes  RESPIRATORY: No cough, wheezing; No shortness of breath  CARDIOVASCULAR: No chest pain or palpitations  GASTROINTESTINAL: No abdominal or epigastric pain. No nausea, vomiting; No diarrhea or constipation.   GENITOURINARY: No dysuria, frequency or hematuria  NEUROLOGICAL: No numbness or weakness  SKIN: See physical examination.  All other review of systems is negative unless indicated above         HOSPITALIST PROGRESS NOTE    Date of Service: 8/28/24    Chief Complaints: Right Testicular Pain  HPI: 69-year-old male  with past medical history of  Prostate cancer (low grade) kidney stones, and HTN presents to the Emergency Room with complaints of  testicular pain. Patient states that this pain begun more than three (3) years ago since he did the first biopsy for evaluation of his prostate cancer. Patient states he has had two of such biopsies, the most recent one being a year ago. Patients states the pain begins gradually and radiates along the inguinal area. He rates the pain at rest as 4/10. This one initiated three days ago, patient was able to tolerate the pain and went to work the previous day but the pain became unbearable and called his doctor, who advised him to come to the ED. Aside from chills and loss of appetite, patient denies fever, headache and shortness of breath. Patient admits to taking Tylenol, cefuroxime and Ibuprofen with little pain relief.  Pt allergic to doxycycline. (8/27/2024)    On assessment today patient was resting comfortably bedside. Patient states his pain has improved since yesterday. No acute distress. No new complaints        Past Medical History:  Hypertension    Kidney stones    Prostate cancer      PSH: S/P appendectomy    S/P tonsillectomy    Nephrostomy status       (27 Aug 2024 15:07)            Allergies: doxycycline (Rash)      Labs:                          13.8   11.62 )-----------( 174      ( 28 Aug 2024 06:39 )             42.4     WBC Trend  11.62<H> Date (08-28 @ 06:39)  11.49<H> Date (08-27 @ 13:29)      Chem  08-28    140  |  108  |  16  ----------------------------<  99  3.7   |  26  |  0.94    Ca    9.0      28 Aug 2024 06:39    TPro  8.1  /  Alb  4.1  /  TBili  0.8  /  DBili  x   /  AST  20  /  ALT  34  /  AlkPhos  53  08-27          T(F): 98.2 (08-28-24 @ 09:15), Max: 99.1 (08-27-24 @ 23:33)  HR: 77 (08-28-24 @ 09:15) (72 - 93)  BP: 112/82 (08-28-24 @ 09:15) (103/71 - 139/77)  RR: 17 (08-28-24 @ 09:15) (16 - 18)  SpO2: 96% (08-28-24 @ 09:15) (94% - 100%)  Wt(kg): --      MEDICATIONS  (STANDING):    MEDICATIONS  (PRN):  acetaminophen     Tablet .. 650 milliGRAM(s) Oral every 6 hours PRN Temp greater or equal to 38C (100.4F), Mild Pain (1 - 3)  aluminum hydroxide/magnesium hydroxide/simethicone Suspension 30 milliLiter(s) Oral every 4 hours PRN Dyspepsia  melatonin 3 milliGRAM(s) Oral at bedtime PRN Insomnia  ondansetron Injectable 4 milliGRAM(s) IV Push every 8 hours PRN Nausea and/or Vomiting          REVIEW OF SYSTEMS:    CONSTITUTIONAL: No weakness, fevers or chills  EYES: No visual changes  RESPIRATORY: No cough, wheezing; No shortness of breath  CARDIOVASCULAR: No chest pain or palpitations  GASTROINTESTINAL: No abdominal or epigastric pain. No nausea, vomiting; No diarrhea or constipation.   GENITOURINARY: No dysuria, frequency or hematuria  NEUROLOGICAL: No numbness or weakness  SKIN: See physical examination.  All other review of systems is negative unless indicated above      Physical Examination    General: Mild respiratory distress   HEENT: Pupils equal, reactive to light.  Symmetric  PULM: Crackles noted auscultating over R lung   CVS: Irregularly irregular rate and rhythm, no murmurs, rubs, or gallops  ABD: Soft, nondistended, nontender  EXT: No edema, nontender  SKIN: Warm and well perfused, mild erythema around the right scrotum (improving)  NEURO: A&Ox3

## 2024-08-28 NOTE — CONSULT NOTE ADULT - ASSESSMENT
A/P 70 y/o male with Right Epididymitis    Continue Rocephin x 10days  Pt can go home with Mid-line or PICC for IV Rocephin  Ck Urine Cultures  Pt should follow up with Dr. Gavin once antibiotic course completed  Above discussed with Dr. Gavin

## 2024-08-28 NOTE — PROGRESS NOTE ADULT - SUBJECTIVE AND OBJECTIVE BOX
Chief Complaint: Patient is a 69y old  Male who presents with a chief complaint of Right Testicular Pain (28 Aug 2024 13:43)      Interval events:   - No acute events overnight, VSS, pt afebrile   - Still with R testicular pain, slight improvement since starting IV abx. BCx/UCx pending     ROS:   Patient denies any current chest pain, SOB, cough, f/c/n/v/d, abd pain, LE edema, myalgias, dysuria, HA, dizziness  All other review of systems is negative unless indicated above    Physical Exam:  Vital Signs Last 24 Hrs  T(C): 36.8 (28 Aug 2024 09:15), Max: 37.3 (27 Aug 2024 23:33)  T(F): 98.2 (28 Aug 2024 09:15), Max: 99.1 (27 Aug 2024 23:33)  HR: 77 (28 Aug 2024 09:15) (72 - 90)  BP: 112/82 (28 Aug 2024 09:15) (103/71 - 139/77)  BP(mean): 92 (27 Aug 2024 18:49) (85 - 92)  RR: 17 (28 Aug 2024 09:15) (16 - 18)  SpO2: 96% (28 Aug 2024 09:15) (94% - 100%)    Parameters below as of 28 Aug 2024 09:15  Patient On (Oxygen Delivery Method): room air    Constitutional: NAD, awake and alert  HEENT: PERRLA, EOMI, MMM  Respiratory: Breath sounds are clear bilaterally, No wheezing, rales or rhonchi  Cardiovascular: S1 and S2, RRR, no murmurs, gallops or rubs  Gastrointestinal: +BS, soft, non-tender, non-distended, no CVA tenderness  : R testicle TTP + edema   Extremities: No peripheral edema, +DP pulses b/l  Neurological: A&O x 3, no focal deficits  Musculoskeletal: 5/5 strength b/l upper and lower extremities  Skin: Normal, skin warm and dry    Labs:  08-28 @ 06:39  Glucose 99 mg/dL  HCO3 26 mmol/L  Chloride 108 mmol/L  Sodium 140 mmol/L>   Potassium 3.7 mmol/L  Creatinine 0.94 mg/dL  Calcium 9.0 mg/dL  BUN 16 mg/dL  eGFR 88 mL/min/1.73m2  Anion gap 6 mmol/L    WBC 11.62  Hemoglobin 13.8  Hemoatocrit 42.4  Platelet count 174      PT/INR - ( 27 Aug 2024 13:29 )   PT: 11.1 sec;   INR: 0.98 ratio         PTT - ( 27 Aug 2024 13:29 )  PTT:27.4 sec    Micro:  BCx - pending  UCx - pending    Radiology:  < from: US Testicles (08.27.24 @ 13:04) >  IMPRESSION:    Right epididymitis.    Small left hydrocele.    < end of copied text >      Medications:  MEDICATIONS  (STANDING):  cefTRIAXone Injectable. 1000 milliGRAM(s) IV Push every 24 hours  heparin   Injectable 5000 Unit(s) SubCutaneous every 12 hours  lisinopril 20 milliGRAM(s) Oral daily    MEDICATIONS  (PRN):  acetaminophen     Tablet .. 650 milliGRAM(s) Oral every 6 hours PRN Temp greater or equal to 38C (100.4F), Mild Pain (1 - 3)  aluminum hydroxide/magnesium hydroxide/simethicone Suspension 30 milliLiter(s) Oral every 4 hours PRN Dyspepsia  melatonin 3 milliGRAM(s) Oral at bedtime PRN Insomnia  ondansetron Injectable 4 milliGRAM(s) IV Push every 8 hours PRN Nausea and/or Vomiting

## 2024-08-29 ENCOUNTER — TRANSCRIPTION ENCOUNTER (OUTPATIENT)
Age: 69
End: 2024-08-29

## 2024-08-29 VITALS
DIASTOLIC BLOOD PRESSURE: 82 MMHG | HEART RATE: 92 BPM | OXYGEN SATURATION: 97 % | RESPIRATION RATE: 18 BRPM | SYSTOLIC BLOOD PRESSURE: 126 MMHG | TEMPERATURE: 98 F

## 2024-08-29 LAB
ANION GAP SERPL CALC-SCNC: 5 MMOL/L — SIGNIFICANT CHANGE UP (ref 5–17)
BUN SERPL-MCNC: 23 MG/DL — SIGNIFICANT CHANGE UP (ref 7–23)
CALCIUM SERPL-MCNC: 9.3 MG/DL — SIGNIFICANT CHANGE UP (ref 8.5–10.1)
CHLORIDE SERPL-SCNC: 109 MMOL/L — HIGH (ref 96–108)
CO2 SERPL-SCNC: 27 MMOL/L — SIGNIFICANT CHANGE UP (ref 22–31)
CREAT SERPL-MCNC: 0.95 MG/DL — SIGNIFICANT CHANGE UP (ref 0.5–1.3)
EGFR: 87 ML/MIN/1.73M2 — SIGNIFICANT CHANGE UP
GLUCOSE SERPL-MCNC: 106 MG/DL — HIGH (ref 70–99)
HCT VFR BLD CALC: 42.5 % — SIGNIFICANT CHANGE UP (ref 39–50)
HGB BLD-MCNC: 14.1 G/DL — SIGNIFICANT CHANGE UP (ref 13–17)
MCHC RBC-ENTMCNC: 29.4 PG — SIGNIFICANT CHANGE UP (ref 27–34)
MCHC RBC-ENTMCNC: 33.2 GM/DL — SIGNIFICANT CHANGE UP (ref 32–36)
MCV RBC AUTO: 88.7 FL — SIGNIFICANT CHANGE UP (ref 80–100)
PLATELET # BLD AUTO: 167 K/UL — SIGNIFICANT CHANGE UP (ref 150–400)
POTASSIUM SERPL-MCNC: 4.2 MMOL/L — SIGNIFICANT CHANGE UP (ref 3.5–5.3)
POTASSIUM SERPL-SCNC: 4.2 MMOL/L — SIGNIFICANT CHANGE UP (ref 3.5–5.3)
RBC # BLD: 4.79 M/UL — SIGNIFICANT CHANGE UP (ref 4.2–5.8)
RBC # FLD: 13.1 % — SIGNIFICANT CHANGE UP (ref 10.3–14.5)
SODIUM SERPL-SCNC: 141 MMOL/L — SIGNIFICANT CHANGE UP (ref 135–145)
WBC # BLD: 8.27 K/UL — SIGNIFICANT CHANGE UP (ref 3.8–10.5)
WBC # FLD AUTO: 8.27 K/UL — SIGNIFICANT CHANGE UP (ref 3.8–10.5)

## 2024-08-29 PROCEDURE — 99239 HOSP IP/OBS DSCHRG MGMT >30: CPT

## 2024-08-29 RX ORDER — ACETAMINOPHEN 325 MG/1
2 TABLET ORAL
Qty: 0 | Refills: 0 | DISCHARGE
Start: 2024-08-29

## 2024-08-29 RX ORDER — POLYETHYLENE GLYCOL 3350 17 G/17G
17 POWDER, FOR SOLUTION ORAL DAILY
Refills: 0 | Status: DISCONTINUED | OUTPATIENT
Start: 2024-08-29 | End: 2024-08-29

## 2024-08-29 RX ADMIN — LISINOPRIL 20 MILLIGRAM(S): 10 TABLET ORAL at 09:13

## 2024-08-29 RX ADMIN — Medication 2000 MILLIGRAM(S): at 14:15

## 2024-08-29 RX ADMIN — ACETAMINOPHEN 650 MILLIGRAM(S): 325 TABLET ORAL at 12:05

## 2024-08-29 RX ADMIN — POLYETHYLENE GLYCOL 3350 17 GRAM(S): 17 POWDER, FOR SOLUTION ORAL at 10:06

## 2024-08-29 NOTE — DISCHARGE NOTE PROVIDER - HOSPITAL COURSE
Physical Exam:  Vital Signs Last 24 Hrs  T(C): 36.7 (29 Aug 2024 08:15), Max: 36.8 (28 Aug 2024 23:30)  T(F): 98.1 (29 Aug 2024 08:15), Max: 98.2 (28 Aug 2024 23:30)  HR: 73 (29 Aug 2024 08:15) (69 - 99)  BP: 126/80 (29 Aug 2024 08:15) (107/64 - 126/80)  BP(mean): --  RR: 18 (29 Aug 2024 08:15) (18 - 18)  SpO2: 96% (29 Aug 2024 08:15) (96% - 100%)    Parameters below as of 29 Aug 2024 08:15  Patient On (Oxygen Delivery Method): room air    Constitutional: NAD, awake and alert  HEENT: PERRLA, EOMI, MMM  Respiratory: Breath sounds are clear bilaterally, No wheezing, rales or rhonchi  Cardiovascular: S1 and S2, RRR, no murmurs, gallops or rubs  Gastrointestinal: +BS, soft, non-tender, non-distended, no CVA tenderness  : R testicle TTP + edema   Extremities: No peripheral edema, +DP pulses b/l  Neurological: A&O x 3, no focal deficits  Musculoskeletal: 5/5 strength b/l upper and lower extremities  Skin: Normal, skin warm and dry    < from: US Testicles (08.27.24 @ 13:04) >  IMPRESSION:    Right epididymitis.    Small left hydrocele.    < end of copied text >    Assessment/Plan:   69-year-old male  with past medical history of  Prostate cancer (low grade) kidney stones, and HTN presents to the Emergency Room with complaints of  testicular pain. Admitted for:     #Right epididymitis, orchitis   #Probable acute on chronic prostatitis, unable to clinically determine    #Small left hydrocele   #Mild leukocytosis   - failed outpatient treatment with cefuroxime and OTC pain meds   - US testicles as above   - UA with small LEs, 8 WBCs. UCx No growth   - Urology consulted, appreciate recs, outpatient f/u with Dr. Gavin upon discharge  - ID consulted, recommends IV CTX 2g daily x4 weeks via midline at home   - BCx NGTD  - pain control  - Leukocytosis resolved    #HTN  - lisinopril     #Hx Prostate cancer   - outpatient f/u for further management       Dispo: discharge to home w/ home care in stable condition    Final diagnosis, treatment plan, and follow-up recommendations were discussed and explained to the patient. The patient was given an opportunity to ask questions concerning the diagnosis and treatment plan. The patient acknowledged understanding of the diagnosis, treatment, and follow-up recommendations. The patient was advised to seek urgent care upon discharge if worsening symptoms develop prior to scheduled follow-up. Time spent on discharge included time with the patient, and also coordinating discharge care as outlined below.    Total time spent: 50 min

## 2024-08-29 NOTE — DISCHARGE NOTE PROVIDER - PROVIDER TOKENS
PROVIDER:[TOKEN:[[id number]],FOLLOWUP:[2 weeks],ESTABLISHEDPATIENT:[T]],PROVIDER:[TOKEN:[[id number]],FOLLOWUP:[1 month],ESTABLISHEDPATIENT:[T]]

## 2024-08-29 NOTE — CONSULT NOTE ADULT - ASSESSMENT
69-year-old male with h/o prostate cancer (low grade), kidney stones, and HTN was admitted on 8/27 for worsening testicular pain. Patient states that this pain begun more than three (3) years ago since he did the first biopsy for evaluation of his prostate cancer. Patient states he has had two of such biopsies, the most recent one being a year ago. Patients states the pain begins gradually and radiates along the inguinal area. He rates the pain at rest as 4/10. This one initiated three days ago, patient was able to tolerate the pain and went to work the previous day but became unbearable and called his doctor, who advised him to come to the ED. Aside from chills and loss of appetite, patient denies fever, headache and shortness of breath. Patient admits to taking Tylenol, cefuroxime and Ibuprofen with little relief.  Pt allergic to doxycycline. In ER he received ceftriaxone.     1. Right orchitis and epididymitis. Prostate Ca.   -chills, possible sepsis  -cultures reviewed  -agree with ceftriaxone 1 gm IV qd  -reason for abx use and side effects reviewed with patient; monitor BMP and vancomycin trough levels  -old chart reviewed to assess prior cultures  -monitor temps  -f/u CBC  -supportive care  2. Other issues:   -care per medicine     69-year-old male with h/o prostate cancer (low grade), kidney stones, recurrent right epididymitis s/p multiple courses of oral abx therapy, and HTN was admitted on 8/27 for worsening testicular pain. Patient states that this pain begun more than three (3) years ago since he did the first biopsy for evaluation of his prostate cancer. Patient states he has had two of such biopsies, the most recent one being a year ago. Patients states the pain begins gradually and radiates along the inguinal area. He rates the pain at rest as 4/10. This one initiated three days ago, patient was able to tolerate the pain and went to work the previous day but became unbearable and called his doctor, who advised him to come to the ED. Aside from chills and loss of appetite, patient denies fever, headache and shortness of breath. Patient admits to taking Tylenol, cefuroxime and Ibuprofen with little relief.  Pt allergic to doxycycline. In ER he received ceftriaxone.     1. Probable acute on chronic prostatitis. Right orchitis and epididymitis. Prostate Ca.   -chills, possible sepsis  -cultures reviewed  -agree with ceftriaxone 2 gm IV qd  -reason for abx use and side effects reviewed with patient; monitor BMP   -old chart reviewed to assess prior cultures  -will need a longer course of IV abx, at least 4 weeks  -home IV abx setup in progress; case reviewed with case management; orders reviewed and called in to pharmacist with infusion company; awaiting insurance approval  -midline  -f/u with urology as outpatient   -monitor temps  -f/u CBC  -supportive care  2. Other issues:   -care per medicine    IV to PO abx token dose not apply

## 2024-08-29 NOTE — DISCHARGE NOTE PROVIDER - NSDCFUSCHEDAPPT_GEN_ALL_CORE_FT
[facility]  UROLOGY [address]  Scheduled Appointment: 09/17/2024

## 2024-08-29 NOTE — DISCHARGE NOTE PROVIDER - NSDCMRMEDTOKEN_GEN_ALL_CORE_FT
acetaminophen 325 mg oral tablet: 2 tab(s) orally every 6 hours As needed Temp greater or equal to 38C (100.4F), Mild Pain (1 - 3)  cefTRIAXone: 2 gram(s) intravenous once a day x28 days  lisinopril-hydrochlorothiazide 20 mg-12.5 mg oral tablet: 1 tab(s) orally once a day  Probiotic Formula oral capsule: 1 cap(s) orally once a day while on ABX  tamsulosin 0.4 mg oral capsule: 1 cap(s) orally once a day (at bedtime)  Vitamin D3 1250 mcg (50,000 intl units) oral capsule: 1 cap(s) orally once a week on Saturday

## 2024-08-29 NOTE — CONSULT NOTE ADULT - SUBJECTIVE AND OBJECTIVE BOX
Patient is a 69y old  Male who presents with a chief complaint of Right Testicular Pain    HPI:  69-year-old male with h/o prostate cancer (low grade), kidney stones, and HTN was admitted on 8/27 for worsening testicular pain. Patient states that this pain begun more than three (3) years ago since he did the first biopsy for evaluation of his prostate cancer. Patient states he has had two of such biopsies, the most recent one being a year ago. Patients states the pain begins gradually and radiates along the inguinal area. He rates the pain at rest as 4/10. This one initiated three days ago, patient was able to tolerate the pain and went to work the previous day but became unbearable and called his doctor, who advised him to come to the ED. Aside from chills and loss of appetite, patient denies fever, headache and shortness of breath. Patient admits to taking Tylenol, cefuroxime and Ibuprofen with little relief.  Pt allergic to doxycycline. In ER he received ceftriaxone.     Past Medical History:  Hypertension  Kidney stones  Prostate cancer      PSH:  appendectomy  tonsillectomy  Nephrostomy status    Meds: per reconciliation sheet, noted below  MEDICATIONS  (STANDING):  cefTRIAXone Injectable. 1000 milliGRAM(s) IV Push every 24 hours  heparin   Injectable 5000 Unit(s) SubCutaneous every 12 hours  lisinopril 20 milliGRAM(s) Oral daily    MEDICATIONS  (PRN):  acetaminophen     Tablet .. 650 milliGRAM(s) Oral every 6 hours PRN Temp greater or equal to 38C (100.4F), Mild Pain (1 - 3)  aluminum hydroxide/magnesium hydroxide/simethicone Suspension 30 milliLiter(s) Oral every 4 hours PRN Dyspepsia  melatonin 3 milliGRAM(s) Oral at bedtime PRN Insomnia  ondansetron Injectable 4 milliGRAM(s) IV Push every 8 hours PRN Nausea and/or Vomiting    Allergies    doxycycline (Rash)    Intolerances      Social: no smoking, no alcohol, no illegal drugs; no recent travel, no exposure to TB  FAMILY HISTORY:    no history of premature cardiovascular disease in first degree relatives    ROS: the patient denies fever, no chills, no HA, no seizures, no dizziness, no sore throat, no nasal congestion, no blurry vision, no CP, no palpitations, no SOB, no cough, no abdominal pain, no diarrhea, no N/V, no dysuria, no leg pain, no claudication, no rash, has right testicular pain, no joint aches, no rectal pain or bleeding, no night sweats  All other systems reviewed and are negative    Vital Signs Last 24 Hrs  T(C): 36.7 (29 Aug 2024 08:15), Max: 36.8 (28 Aug 2024 23:30)  T(F): 98.1 (29 Aug 2024 08:15), Max: 98.2 (28 Aug 2024 23:30)  HR: 73 (29 Aug 2024 08:15) (69 - 99)  BP: 126/80 (29 Aug 2024 08:15) (107/64 - 126/80)  BP(mean): --  RR: 18 (29 Aug 2024 08:15) (18 - 18)  SpO2: 96% (29 Aug 2024 08:15) (96% - 100%)    Parameters below as of 29 Aug 2024 08:15  Patient On (Oxygen Delivery Method): room air    PE:    Constitutional:  No acute distress  HEENT: NC/AT, EOMI, PERRLA, conjunctivae clear; ears and nose atraumatic; pharynx benign  Neck: supple; thyroid not palpable  Back: no tenderness  Respiratory: respiratory effort normal; clear to auscultation  Cardiovascular: S1S2 regular, no murmurs  Abdomen: soft, not tender, not distended, positive BS; no liver or spleen organomegaly  Genitourinary: no suprapubic tenderness  Lymphatic: no LN palpable  Musculoskeletal: no muscle tenderness, no joint swelling or tenderness  Extremities: no pedal edema  Neurological/ Psychiatric: AxOx3, judgement and insight normal; moving all extremities  Skin: no rashes; no palpable lesions    Labs: all available labs reviewed                        14.1   8.27  )-----------( 167      ( 29 Aug 2024 08:32 )             42.5     08-29    141  |  109<H>  |  23  ----------------------------<  106<H>  4.2   |  27  |  0.95    Ca    9.3      29 Aug 2024 08:32    TPro  8.1  /  Alb  4.1  /  TBili  0.8  /  DBili  x   /  AST  20  /  ALT  34  /  AlkPhos  53  08-27     LIVER FUNCTIONS - ( 27 Aug 2024 13:29 )  Alb: 4.1 g/dL / Pro: 8.1 gm/dL / ALK PHOS: 53 U/L / ALT: 34 U/L / AST: 20 U/L / GGT: x           Urinalysis with Rflx Culture (collected 27 Aug 2024 13:29)    Culture - Blood (collected 27 Aug 2024 13:29)  Source: .Blood Blood-Peripheral  Preliminary Report (28 Aug 2024 19:01):    No growth at 24 hours    Culture - Urine (collected 27 Aug 2024 13:29)  Source: Clean Catch None  Final Report (28 Aug 2024 23:38):    No growth    Culture - Blood (collected 27 Aug 2024 13:29)  Source: .Blood Blood-Peripheral  Preliminary Report (28 Aug 2024 19:01):    No growth at 24 hours    Radiology: all available radiological tests reviewed    < from: US Testicles (08.27.24 @ 13:04) >  Right testis: 3.5 cm x 3.4 cm x 2.6 cm. Normal echogenicity and echotexture with no masses or areas of architectural distortion. Normal arterial and venous blood flow pattern.  Right epididymis: Heterogeneous, enlarged, and increased vascularity.  Right hydrocele: None.  Right varicocele: None.  < end of copied text >    < from: US Testicles (08.27.24 @ 13:04) >  Right epididymitis.  Small left hydrocele.  < end of copied text >    Advanced directives addressed: full resuscitation Patient is a 69y old  Male who presents with a chief complaint of Right Testicular Pain    HPI:  69-year-old male with h/o prostate cancer (low grade), kidney stones, recurrent right epididymitis s/p multiple courses of oral abx therapy, and HTN was admitted on 8/27 for worsening testicular pain. Patient states that this pain begun more than three (3) years ago since he did the first biopsy for evaluation of his prostate cancer. Patient states he has had two of such biopsies, the most recent one being a year ago. Patients states the pain begins gradually and radiates along the inguinal area. He rates the pain at rest as 4/10. This one initiated three days ago, patient was able to tolerate the pain and went to work the previous day but became unbearable and called his doctor, who advised him to come to the ED. Aside from chills and loss of appetite, patient denies fever, headache and shortness of breath. Patient admits to taking Tylenol, cefuroxime and Ibuprofen with little relief.  Pt allergic to doxycycline. In ER he received ceftriaxone.     Past Medical History:  Hypertension  Kidney stones  Prostate cancer      PSH:  appendectomy  tonsillectomy  Nephrostomy status    Meds: per reconciliation sheet, noted below  MEDICATIONS  (STANDING):  cefTRIAXone Injectable. 1000 milliGRAM(s) IV Push every 24 hours  heparin   Injectable 5000 Unit(s) SubCutaneous every 12 hours  lisinopril 20 milliGRAM(s) Oral daily    MEDICATIONS  (PRN):  acetaminophen     Tablet .. 650 milliGRAM(s) Oral every 6 hours PRN Temp greater or equal to 38C (100.4F), Mild Pain (1 - 3)  aluminum hydroxide/magnesium hydroxide/simethicone Suspension 30 milliLiter(s) Oral every 4 hours PRN Dyspepsia  melatonin 3 milliGRAM(s) Oral at bedtime PRN Insomnia  ondansetron Injectable 4 milliGRAM(s) IV Push every 8 hours PRN Nausea and/or Vomiting    Allergies    doxycycline (Rash)    Intolerances      Social: no smoking, no alcohol, no illegal drugs; no recent travel, no exposure to TB  FAMILY HISTORY:    no history of premature cardiovascular disease in first degree relatives    ROS: the patient denies fever, no chills, no HA, no seizures, no dizziness, no sore throat, no nasal congestion, no blurry vision, no CP, no palpitations, no SOB, no cough, no abdominal pain, no diarrhea, no N/V, no dysuria, no leg pain, no claudication, no rash, has right testicular pain, no joint aches, no rectal pain or bleeding, no night sweats  All other systems reviewed and are negative    Vital Signs Last 24 Hrs  T(C): 36.7 (29 Aug 2024 08:15), Max: 36.8 (28 Aug 2024 23:30)  T(F): 98.1 (29 Aug 2024 08:15), Max: 98.2 (28 Aug 2024 23:30)  HR: 73 (29 Aug 2024 08:15) (69 - 99)  BP: 126/80 (29 Aug 2024 08:15) (107/64 - 126/80)  BP(mean): --  RR: 18 (29 Aug 2024 08:15) (18 - 18)  SpO2: 96% (29 Aug 2024 08:15) (96% - 100%)    Parameters below as of 29 Aug 2024 08:15  Patient On (Oxygen Delivery Method): room air    PE:    Constitutional:  No acute distress  HEENT: NC/AT, EOMI, PERRLA, conjunctivae clear; ears and nose atraumatic; pharynx benign  Neck: supple; thyroid not palpable  Back: no tenderness  Respiratory: respiratory effort normal; clear to auscultation  Cardiovascular: S1S2 regular, no murmurs  Abdomen: soft, not tender, not distended, positive BS; no liver or spleen organomegaly  Genitourinary: no suprapubic tenderness  Lymphatic: no LN palpable  Musculoskeletal: no muscle tenderness, no joint swelling or tenderness  Extremities: no pedal edema  Neurological/ Psychiatric: AxOx3, judgement and insight normal; moving all extremities  Skin: no rashes; no palpable lesions    Labs: all available labs reviewed                        14.1   8.27  )-----------( 167      ( 29 Aug 2024 08:32 )             42.5     08-29    141  |  109<H>  |  23  ----------------------------<  106<H>  4.2   |  27  |  0.95    Ca    9.3      29 Aug 2024 08:32    TPro  8.1  /  Alb  4.1  /  TBili  0.8  /  DBili  x   /  AST  20  /  ALT  34  /  AlkPhos  53  08-27     LIVER FUNCTIONS - ( 27 Aug 2024 13:29 )  Alb: 4.1 g/dL / Pro: 8.1 gm/dL / ALK PHOS: 53 U/L / ALT: 34 U/L / AST: 20 U/L / GGT: x           Urinalysis with Rflx Culture (collected 27 Aug 2024 13:29)    Culture - Blood (collected 27 Aug 2024 13:29)  Source: .Blood Blood-Peripheral  Preliminary Report (28 Aug 2024 19:01):    No growth at 24 hours    Culture - Urine (collected 27 Aug 2024 13:29)  Source: Clean Catch None  Final Report (28 Aug 2024 23:38):    No growth    Culture - Blood (collected 27 Aug 2024 13:29)  Source: .Blood Blood-Peripheral  Preliminary Report (28 Aug 2024 19:01):    No growth at 24 hours    Radiology: all available radiological tests reviewed    < from: US Testicles (08.27.24 @ 13:04) >  Right testis: 3.5 cm x 3.4 cm x 2.6 cm. Normal echogenicity and echotexture with no masses or areas of architectural distortion. Normal arterial and venous blood flow pattern.  Right epididymis: Heterogeneous, enlarged, and increased vascularity.  Right hydrocele: None.  Right varicocele: None.  < end of copied text >    < from: US Testicles (08.27.24 @ 13:04) >  Right epididymitis.  Small left hydrocele.  < end of copied text >    Advanced directives addressed: full resuscitation

## 2024-08-29 NOTE — DISCHARGE NOTE NURSING/CASE MANAGEMENT/SOCIAL WORK - PATIENT PORTAL LINK FT
You can access the FollowMyHealth Patient Portal offered by [facility] by registering at the following website: [email or web address]. By joining [employer]’s FollowMyHealth portal, you will also be able to view your health information using other applications (apps) compatible with our system.

## 2024-08-29 NOTE — DISCHARGE NOTE PROVIDER - NSDCACTIVITY_GEN_ALL_CORE
No working until completion of IV antibiotics and midline removed/No heavy lifting/straining/Activity as tolerated

## 2024-08-29 NOTE — DISCHARGE NOTE PROVIDER - CARE PROVIDERS DIRECT ADDRESSES
[email or web address]

## 2024-08-29 NOTE — ADVANCED PRACTICE NURSE CONSULT - ASSESSMENT
Pt confirmed using two identifiers (name and ). Pt is alert and oriented and consented to procedure. Reviewed chart and provider orders. Pt prepped in sterile fashion. Using strict aseptic technique that was maintained throughout procedure and guided by ultrasound, 2cc of 1%lidocaine was instilled subdermally, and a [employer] Power Midline Lot #[id number], 18g, 10cm, was placed in the left basilic vein. Brisk blood return observed and 20cc NS flushed without difficulty. Stat lock and Biopatch applied and site dressed. Pt tolerated procedure well. Midline care instructions were given to patient. All questions answered. Do Not Use Extremity band applied to left wrist. Report given to primary RN.

## 2024-08-29 NOTE — DISCHARGE NOTE PROVIDER - CARE PROVIDER_API CALL
Jesus Gavin  Urology  [address] [facility]  [place] [address]  Phone: [phone]  Fax: [phone]  Established Patient  Follow Up Time: 2 weeks    Clovis Farfan  Internal Medicine  [address] [place] [address]  Phone: [phone]  Fax: [phone]  Established Patient  Follow Up Time: 1 month

## 2024-09-01 LAB
CULTURE RESULTS: SIGNIFICANT CHANGE UP
CULTURE RESULTS: SIGNIFICANT CHANGE UP
SPECIMEN SOURCE: SIGNIFICANT CHANGE UP
SPECIMEN SOURCE: SIGNIFICANT CHANGE UP

## 2024-09-04 DIAGNOSIS — Z79.899 OTHER LONG TERM (CURRENT) DRUG THERAPY: ICD-10-CM

## 2024-09-04 DIAGNOSIS — Z87.891 PERSONAL HISTORY OF NICOTINE DEPENDENCE: ICD-10-CM

## 2024-09-04 DIAGNOSIS — N20.0 CALCULUS OF KIDNEY: ICD-10-CM

## 2024-09-04 DIAGNOSIS — N41.0 ACUTE PROSTATITIS: ICD-10-CM

## 2024-09-04 DIAGNOSIS — N41.1 CHRONIC PROSTATITIS: ICD-10-CM

## 2024-09-04 DIAGNOSIS — N43.3 HYDROCELE, UNSPECIFIED: ICD-10-CM

## 2024-09-04 DIAGNOSIS — Z93.6 OTHER ARTIFICIAL OPENINGS OF URINARY TRACT STATUS: ICD-10-CM

## 2024-09-04 DIAGNOSIS — I10 ESSENTIAL (PRIMARY) HYPERTENSION: ICD-10-CM

## 2024-09-04 DIAGNOSIS — C61 MALIGNANT NEOPLASM OF PROSTATE: ICD-10-CM

## 2024-09-04 DIAGNOSIS — N39.0 URINARY TRACT INFECTION, SITE NOT SPECIFIED: ICD-10-CM

## 2024-09-04 DIAGNOSIS — N45.2 ORCHITIS: ICD-10-CM

## 2024-09-04 DIAGNOSIS — N45.1 EPIDIDYMITIS: ICD-10-CM

## 2024-09-04 DIAGNOSIS — Z88.1 ALLERGY STATUS TO OTHER ANTIBIOTIC AGENTS: ICD-10-CM

## 2024-09-12 ENCOUNTER — APPOINTMENT (OUTPATIENT)
Dept: INTERNAL MEDICINE | Facility: CLINIC | Age: 69
End: 2024-09-12

## 2024-09-17 ENCOUNTER — APPOINTMENT (OUTPATIENT)
Dept: UROLOGY | Facility: CLINIC | Age: 69
End: 2024-09-17
Payer: COMMERCIAL

## 2024-09-17 DIAGNOSIS — C61 MALIGNANT NEOPLASM OF PROSTATE: ICD-10-CM

## 2024-09-17 DIAGNOSIS — N45.1 EPIDIDYMITIS: ICD-10-CM

## 2024-09-17 DIAGNOSIS — N13.8 BENIGN PROSTATIC HYPERPLASIA WITH LOWER URINARY TRACT SYMPMS: ICD-10-CM

## 2024-09-17 DIAGNOSIS — N40.1 BENIGN PROSTATIC HYPERPLASIA WITH LOWER URINARY TRACT SYMPMS: ICD-10-CM

## 2024-09-17 PROCEDURE — 99213 OFFICE O/P EST LOW 20 MIN: CPT

## 2024-09-17 NOTE — HISTORY OF PRESENT ILLNESS
[FreeTextEntry1] : 69-year-old man seen 07/30/2024 with complaint of testicle infections. He reports pain in RIGHT scrotum accompanied by fevers. This has recurred a few times, responding to abx but then returning. Denies LUTS other complaints. He is currently on course of cefuroxime and pain has improved.   09/17/24: Patient presents for follow up. [facility] after failing oral abx for treatment of epididymitis. Now being treated for epididymitis with IV abx with VNS. He reports scrotal pain has resolved. Taking tamsulosin 0.4mg has weak stream with some urgency. Nocturia 2-3x. On active surveillance for Prostate cancer Chama 3=3=6 disease. Last MRI (05/19/23) showed no targetable lesions. PIRAIDS 2. PSAD 0.12.   Denies dysuria, hematuria, lower abdominal or flank pain, fever, chills or rigors.

## 2024-09-17 NOTE — PHYSICAL EXAM
[Normal Appearance] : normal appearance [Well Groomed] : well groomed [General Appearance - In No Acute Distress] : no acute distress [] : no respiratory distress [Respiration, Rhythm And Depth] : normal respiratory rhythm and effort [Exaggerated Use Of Accessory Muscles For Inspiration] : no accessory muscle use [Abdomen Soft] : soft [Abdomen Tenderness] : non-tender [Urinary Bladder Findings] : the bladder was normal on palpation [Normal Station and Gait] : the gait and station were normal for the patient's age [No Focal Deficits] : no focal deficits [Oriented To Time, Place, And Person] : oriented to person, place, and time [Affect] : the affect was normal [Mood] : the mood was normal

## 2024-09-17 NOTE — ASSESSMENT
[FreeTextEntry1] : 69-year-old M with BPH with LUTS and Right Epididymitis. Epididymitis responding well to IV abx. Recommend D-Mannose OTC supplements to help reduce incidence of infections. For BPH, discussed treatment options. Will increase tamsulosin to 0.8mg.  For Prostate Cancer, recommend repeat MRI for active surveillance after infections resolves.   Patient will RTO in 6 weeks for symptom check.

## 2024-10-29 ENCOUNTER — APPOINTMENT (OUTPATIENT)
Dept: UROLOGY | Facility: CLINIC | Age: 69
End: 2024-10-29
Payer: COMMERCIAL

## 2024-10-29 DIAGNOSIS — C61 MALIGNANT NEOPLASM OF PROSTATE: ICD-10-CM

## 2024-10-29 PROCEDURE — 99214 OFFICE O/P EST MOD 30 MIN: CPT

## 2024-11-21 ENCOUNTER — APPOINTMENT (OUTPATIENT)
Dept: MRI IMAGING | Facility: CLINIC | Age: 69
End: 2024-11-21

## 2024-11-21 ENCOUNTER — OUTPATIENT (OUTPATIENT)
Dept: OUTPATIENT SERVICES | Facility: HOSPITAL | Age: 69
LOS: 1 days | End: 2024-11-21
Payer: COMMERCIAL

## 2024-11-21 ENCOUNTER — RESULT REVIEW (OUTPATIENT)
Age: 69
End: 2024-11-21

## 2024-11-21 DIAGNOSIS — Z00.8 ENCOUNTER FOR OTHER GENERAL EXAMINATION: ICD-10-CM

## 2024-11-21 DIAGNOSIS — Z98.89 OTHER SPECIFIED POSTPROCEDURAL STATES: Chronic | ICD-10-CM

## 2024-11-21 DIAGNOSIS — Z93.6 OTHER ARTIFICIAL OPENINGS OF URINARY TRACT STATUS: Chronic | ICD-10-CM

## 2024-11-21 DIAGNOSIS — C61 MALIGNANT NEOPLASM OF PROSTATE: ICD-10-CM

## 2024-11-21 PROCEDURE — 72197 MRI PELVIS W/O & W/DYE: CPT

## 2024-11-21 PROCEDURE — 76498 UNLISTED MR PROCEDURE: CPT

## 2024-11-21 PROCEDURE — 76498P: CUSTOM | Mod: 26

## 2024-11-21 PROCEDURE — 72197 MRI PELVIS W/O & W/DYE: CPT | Mod: 26

## 2024-11-21 PROCEDURE — A9585: CPT

## 2024-12-04 ENCOUNTER — OUTPATIENT (OUTPATIENT)
Dept: OUTPATIENT SERVICES | Facility: HOSPITAL | Age: 69
LOS: 1 days | End: 2024-12-04
Payer: COMMERCIAL

## 2024-12-04 ENCOUNTER — OUTPATIENT (OUTPATIENT)
Dept: OUTPATIENT SERVICES | Facility: HOSPITAL | Age: 69
LOS: 1 days | End: 2024-12-04

## 2024-12-04 DIAGNOSIS — R97.20 ELEVATED PROSTATE SPECIFIC ANTIGEN [PSA]: ICD-10-CM

## 2024-12-04 DIAGNOSIS — Z98.89 OTHER SPECIFIED POSTPROCEDURAL STATES: Chronic | ICD-10-CM

## 2024-12-04 DIAGNOSIS — Z00.8 ENCOUNTER FOR OTHER GENERAL EXAMINATION: ICD-10-CM

## 2024-12-04 DIAGNOSIS — Z93.6 OTHER ARTIFICIAL OPENINGS OF URINARY TRACT STATUS: Chronic | ICD-10-CM

## 2024-12-04 PROCEDURE — C8001: CPT

## 2024-12-05 ENCOUNTER — APPOINTMENT (OUTPATIENT)
Dept: UROLOGY | Facility: CLINIC | Age: 69
End: 2024-12-05
Payer: COMMERCIAL

## 2024-12-05 VITALS
DIASTOLIC BLOOD PRESSURE: 71 MMHG | OXYGEN SATURATION: 100 % | BODY MASS INDEX: 27 KG/M2 | WEIGHT: 172 LBS | HEIGHT: 67 IN | HEART RATE: 68 BPM | RESPIRATION RATE: 16 BRPM | SYSTOLIC BLOOD PRESSURE: 114 MMHG

## 2024-12-05 DIAGNOSIS — N40.1 BENIGN PROSTATIC HYPERPLASIA WITH LOWER URINARY TRACT SYMPMS: ICD-10-CM

## 2024-12-05 DIAGNOSIS — N13.8 BENIGN PROSTATIC HYPERPLASIA WITH LOWER URINARY TRACT SYMPMS: ICD-10-CM

## 2024-12-05 DIAGNOSIS — C61 MALIGNANT NEOPLASM OF PROSTATE: ICD-10-CM

## 2024-12-05 DIAGNOSIS — N45.1 EPIDIDYMITIS: ICD-10-CM

## 2024-12-05 PROCEDURE — 99214 OFFICE O/P EST MOD 30 MIN: CPT

## 2025-01-24 ENCOUNTER — NON-APPOINTMENT (OUTPATIENT)
Age: 70
End: 2025-01-24

## 2025-01-30 ENCOUNTER — APPOINTMENT (OUTPATIENT)
Dept: UROLOGY | Facility: CLINIC | Age: 70
End: 2025-01-30

## 2025-01-30 ENCOUNTER — OUTPATIENT (OUTPATIENT)
Dept: OUTPATIENT SERVICES | Facility: HOSPITAL | Age: 70
LOS: 1 days | End: 2025-01-30

## 2025-01-30 DIAGNOSIS — Z98.89 OTHER SPECIFIED POSTPROCEDURAL STATES: Chronic | ICD-10-CM

## 2025-01-30 DIAGNOSIS — R35.0 FREQUENCY OF MICTURITION: ICD-10-CM

## 2025-01-30 DIAGNOSIS — Z93.6 OTHER ARTIFICIAL OPENINGS OF URINARY TRACT STATUS: Chronic | ICD-10-CM

## 2025-01-30 PROCEDURE — 76775 US EXAM ABDO BACK WALL LIM: CPT

## 2025-01-30 PROCEDURE — 76775 US EXAM ABDO BACK WALL LIM: CPT | Mod: 26

## 2025-01-30 PROCEDURE — 99214 OFFICE O/P EST MOD 30 MIN: CPT

## 2025-03-06 ENCOUNTER — OUTPATIENT (OUTPATIENT)
Dept: OUTPATIENT SERVICES | Facility: HOSPITAL | Age: 70
LOS: 1 days | End: 2025-03-06

## 2025-03-06 VITALS
SYSTOLIC BLOOD PRESSURE: 103 MMHG | RESPIRATION RATE: 18 BRPM | WEIGHT: 154.1 LBS | HEIGHT: 66 IN | OXYGEN SATURATION: 98 % | DIASTOLIC BLOOD PRESSURE: 69 MMHG | TEMPERATURE: 98 F | HEART RATE: 85 BPM

## 2025-03-06 DIAGNOSIS — Z91.89 OTHER SPECIFIED PERSONAL RISK FACTORS, NOT ELSEWHERE CLASSIFIED: ICD-10-CM

## 2025-03-06 DIAGNOSIS — C61 MALIGNANT NEOPLASM OF PROSTATE: ICD-10-CM

## 2025-03-06 DIAGNOSIS — Z98.890 OTHER SPECIFIED POSTPROCEDURAL STATES: Chronic | ICD-10-CM

## 2025-03-06 DIAGNOSIS — I10 ESSENTIAL (PRIMARY) HYPERTENSION: ICD-10-CM

## 2025-03-06 DIAGNOSIS — Z98.89 OTHER SPECIFIED POSTPROCEDURAL STATES: Chronic | ICD-10-CM

## 2025-03-06 DIAGNOSIS — Z93.6 OTHER ARTIFICIAL OPENINGS OF URINARY TRACT STATUS: Chronic | ICD-10-CM

## 2025-03-06 RX ORDER — ATORVASTATIN CALCIUM 80 MG/1
1 TABLET, FILM COATED ORAL
Refills: 0 | DISCHARGE

## 2025-03-06 NOTE — H&P PST ADULT - NSICDXPASTSURGICALHX_GEN_ALL_CORE_FT
PAST SURGICAL HISTORY:  History of lithotripsy     Nephrostomy status rt nephrostomy tube last year    S/P appendectomy     S/P tonsillectomy

## 2025-03-06 NOTE — H&P PST ADULT - MUSCULOSKELETAL
normal/no joint swelling/no joint erythema/no joint warmth/no calf tenderness/normal gait/strength 5/5 bilateral upper extremities/strength 5/5 bilateral lower extremities negative

## 2025-03-06 NOTE — H&P PST ADULT - NSICDXPASTMEDICALHX_GEN_ALL_CORE_FT
PAST MEDICAL HISTORY:  HLD (hyperlipidemia)     Hypertension     Kidney stones     Malignant neoplasm of prostate     Prostate cancer

## 2025-03-06 NOTE — H&P PST ADULT - HISTORY OF PRESENT ILLNESS
70 year old male with pmhx of HTN, HLD, BPH, Prostate cancer (Low risk), presents for pre-op evaluation for diagnosis of Malignant neoplasm of prostate. Patient had MRI pelvis (November 2024) which showed lesion High (clinically significant cancer is likely to be present). Pt c/o increased urinary frequency, urinary hesitancy. Denies hematuria, dysuria, fevers, cp, sob, abdi, dizziness, palpitations, n/v/d/c.

## 2025-03-06 NOTE — H&P PST ADULT - PROBLEM SELECTOR PLAN 1
Patient tentatively scheduled for MRI US fusion transperineal prostate biopsy on 3/14/25  Pre-op instructions provided. Pt given verbal and written instructions with teach back on cpepcid. Pt verbalized understanding.  Urine culture was done at [facility].

## 2025-03-07 LAB
CULTURE RESULTS: SIGNIFICANT CHANGE UP
SPECIMEN SOURCE: SIGNIFICANT CHANGE UP

## 2025-03-11 ENCOUNTER — NON-APPOINTMENT (OUTPATIENT)
Age: 70
End: 2025-03-11

## 2025-03-14 ENCOUNTER — TRANSCRIPTION ENCOUNTER (OUTPATIENT)
Age: 70
End: 2025-03-14

## 2025-03-14 ENCOUNTER — RESULT REVIEW (OUTPATIENT)
Age: 70
End: 2025-03-14

## 2025-03-14 ENCOUNTER — APPOINTMENT (OUTPATIENT)
Dept: UROLOGY | Facility: AMBULATORY SURGERY CENTER | Age: 70
End: 2025-03-14

## 2025-03-14 ENCOUNTER — OUTPATIENT (OUTPATIENT)
Dept: OUTPATIENT SERVICES | Facility: HOSPITAL | Age: 70
LOS: 1 days | Discharge: HOME | End: 2025-03-14
Payer: COMMERCIAL

## 2025-03-14 VITALS
RESPIRATION RATE: 16 BRPM | TEMPERATURE: 99 F | HEART RATE: 68 BPM | SYSTOLIC BLOOD PRESSURE: 112 MMHG | WEIGHT: 154.32 LBS | OXYGEN SATURATION: 97 % | DIASTOLIC BLOOD PRESSURE: 74 MMHG

## 2025-03-14 VITALS
OXYGEN SATURATION: 98 % | DIASTOLIC BLOOD PRESSURE: 75 MMHG | RESPIRATION RATE: 17 BRPM | SYSTOLIC BLOOD PRESSURE: 119 MMHG | TEMPERATURE: 98 F | HEART RATE: 72 BPM

## 2025-03-14 DIAGNOSIS — Z93.6 OTHER ARTIFICIAL OPENINGS OF URINARY TRACT STATUS: Chronic | ICD-10-CM

## 2025-03-14 DIAGNOSIS — C61 MALIGNANT NEOPLASM OF PROSTATE: ICD-10-CM

## 2025-03-14 DIAGNOSIS — Z98.89 OTHER SPECIFIED POSTPROCEDURAL STATES: Chronic | ICD-10-CM

## 2025-03-14 DIAGNOSIS — Z98.890 OTHER SPECIFIED POSTPROCEDURAL STATES: Chronic | ICD-10-CM

## 2025-03-14 LAB — POTASSIUM BLDV-SCNC: 3.8 MMOL/L — SIGNIFICANT CHANGE UP (ref 3.5–5.1)

## 2025-03-14 PROCEDURE — 55700: CPT

## 2025-03-14 PROCEDURE — G0416: CPT | Mod: 26

## 2025-03-14 PROCEDURE — 76999F: CUSTOM | Mod: 26

## 2025-03-14 NOTE — ASU DISCHARGE PLAN (ADULT/PEDIATRIC) - SPECIFY DIET AND FLUID
Progress to regular diet as tolerated.  Keep well hydrated. Keep first meal light. Nothing fried, spicy or greasy. Increase fluids.
Heterosexual

## 2025-03-14 NOTE — ASU DISCHARGE PLAN (ADULT/PEDIATRIC) - CALL YOUR DOCTOR IF YOU HAVE ANY OF THE FOLLOWING:
Fever greater than (need to indicate Fahrenheit or Celsius) Bleeding that does not stop/Swelling that gets worse/Pain not relieved by Medications/Fever greater than (need to indicate Fahrenheit or Celsius)/Wound/Surgical Site with redness, or foul smelling discharge or pus/Nausea and vomiting that does not stop/Unable to urinate

## 2025-03-14 NOTE — ASU DISCHARGE PLAN (ADULT/PEDIATRIC) - FINANCIAL ASSISTANCE
[facility] provides services at a reduced cost to those who are determined to be eligible through [facility]’s financial assistance program. Information regarding [facility]’s financial assistance program can be found by going to [email or web address] or by calling [phone].

## 2025-03-14 NOTE — ASU DISCHARGE PLAN (ADULT/PEDIATRIC) - NS MD DC FALL RISK RISK
For information on Fall & Injury Prevention, visit: [email or web address] OR  [email or web address] OR  https://www.cdc.gov/steadi/patient.html

## 2025-03-14 NOTE — ASU DISCHARGE PLAN (ADULT/PEDIATRIC) - ASU DC SPECIAL INSTRUCTIONSFT
GENERAL: It is common to have blood in your urine and/or semen after your procedure. It may be pink or even red; inform your doctor if you have a significant amount of clot in the urine or if you are unable to void at all. The urine may clear and then become bloody again especially as you are more physically active.  BATHING: You may shower or bathe.  DIET: You may resume your regular diet and regular medication regimen.  PAIN: You may take Tylenol (acetaminophen) 650-975mg and/or Motrin (ibuprofen) 400-600mg, both available over the counter, for pain every 6 hours as needed. Do not exceed 4000mg of Tylenol (acetaminophen) daily. You may alternate these medications such that you take one or the other every 3 hours for around the clock pain coverage.  STOOL SOFTENERS: Do not allow yourself to become constipated as straining may cause bleeding. Take stool softeners or a laxative (ex. Miralax, Colace, Senokot, ExLax, etc), available over the counter, if needed.  ACTIVITY: No heavy lifting or strenuous exercise, otherwise, you may return to your usual level of physical activity.  FOLLOW-UP: Dr. Resendiz will call you with the pathology results and when to follow up.     CALL YOUR UROLOGIST IF: You have any bleeding that does not stop, inability to void >8 hours, fever over 100.4 F, chills, persistent nausea/vomiting, changes in your incision concerning for infection, or if your pain is not controlled on your discharge pain medications.

## 2025-03-14 NOTE — ASU DISCHARGE PLAN (ADULT/PEDIATRIC) - CARE PROVIDER_API CALL
Tirso Resendiz.  Urology  [address] [place] [address], [address] [address]  Phone: [phone]  Fax: [phone]  Follow Up Time:

## 2025-03-14 NOTE — ASU PREOPERATIVE ASSESSMENT, ADULT ([facility] ONLY) - FALL HARM RISK - UNIVERSAL INTERVENTIONS
Bed in lowest position, wheels locked, appropriate side rails in place/Call bell, personal items and telephone in reach/Instruct patient to call for assistance before getting out of bed or chair/Non-slip footwear when patient is out of bed/[place] to call system/Physically safe environment - no spills, clutter or unnecessary equipment/Purposeful Proactive Rounding/Room/bathroom lighting operational, light cord in reach

## 2025-03-19 LAB — SURGICAL PATHOLOGY STUDY: SIGNIFICANT CHANGE UP

## 2025-03-25 ENCOUNTER — NON-APPOINTMENT (OUTPATIENT)
Age: 70
End: 2025-03-25

## 2025-03-26 PROBLEM — C61 MALIGNANT NEOPLASM OF PROSTATE: Chronic | Status: ACTIVE | Noted: 2025-03-06

## 2025-03-26 PROBLEM — E78.5 HYPERLIPIDEMIA, UNSPECIFIED: Chronic | Status: ACTIVE | Noted: 2025-03-06

## 2025-03-27 ENCOUNTER — APPOINTMENT (OUTPATIENT)
Dept: UROLOGY | Facility: CLINIC | Age: 70
End: 2025-03-27
Payer: COMMERCIAL

## 2025-03-27 VITALS
OXYGEN SATURATION: 97 % | RESPIRATION RATE: 16 BRPM | HEIGHT: 67 IN | DIASTOLIC BLOOD PRESSURE: 67 MMHG | SYSTOLIC BLOOD PRESSURE: 99 MMHG | WEIGHT: 172 LBS | BODY MASS INDEX: 27 KG/M2 | HEART RATE: 82 BPM

## 2025-03-27 DIAGNOSIS — N13.8 BENIGN PROSTATIC HYPERPLASIA WITH LOWER URINARY TRACT SYMPMS: ICD-10-CM

## 2025-03-27 DIAGNOSIS — C61 MALIGNANT NEOPLASM OF PROSTATE: ICD-10-CM

## 2025-03-27 DIAGNOSIS — N40.1 BENIGN PROSTATIC HYPERPLASIA WITH LOWER URINARY TRACT SYMPMS: ICD-10-CM

## 2025-03-27 PROCEDURE — G2211 COMPLEX E/M VISIT ADD ON: CPT | Mod: NC

## 2025-03-27 PROCEDURE — 99215 OFFICE O/P EST HI 40 MIN: CPT

## 2025-04-02 ENCOUNTER — NON-APPOINTMENT (OUTPATIENT)
Age: 70
End: 2025-04-02

## 2025-04-07 ENCOUNTER — TRANSCRIPTION ENCOUNTER (OUTPATIENT)
Age: 70
End: 2025-04-07

## 2025-04-07 ENCOUNTER — OUTPATIENT (OUTPATIENT)
Dept: OUTPATIENT SERVICES | Facility: HOSPITAL | Age: 70
LOS: 1 days | End: 2025-04-07
Payer: COMMERCIAL

## 2025-04-07 ENCOUNTER — APPOINTMENT (OUTPATIENT)
Dept: UROLOGY | Facility: AMBULATORY SURGERY CENTER | Age: 70
End: 2025-04-07

## 2025-04-07 VITALS
DIASTOLIC BLOOD PRESSURE: 78 MMHG | OXYGEN SATURATION: 98 % | SYSTOLIC BLOOD PRESSURE: 108 MMHG | TEMPERATURE: 98 F | HEART RATE: 74 BPM | RESPIRATION RATE: 17 BRPM | WEIGHT: 154.1 LBS | HEIGHT: 66 IN

## 2025-04-07 VITALS
TEMPERATURE: 98 F | OXYGEN SATURATION: 100 % | SYSTOLIC BLOOD PRESSURE: 117 MMHG | HEART RATE: 77 BPM | DIASTOLIC BLOOD PRESSURE: 83 MMHG | RESPIRATION RATE: 15 BRPM

## 2025-04-07 DIAGNOSIS — Z98.89 OTHER SPECIFIED POSTPROCEDURAL STATES: Chronic | ICD-10-CM

## 2025-04-07 DIAGNOSIS — Z93.6 OTHER ARTIFICIAL OPENINGS OF URINARY TRACT STATUS: Chronic | ICD-10-CM

## 2025-04-07 DIAGNOSIS — N40.1 BENIGN PROSTATIC HYPERPLASIA WITH LOWER URINARY TRACT SYMPTOMS: ICD-10-CM

## 2025-04-07 DIAGNOSIS — Z98.890 OTHER SPECIFIED POSTPROCEDURAL STATES: Chronic | ICD-10-CM

## 2025-04-07 LAB — POTASSIUM BLDV-SCNC: 3.8 MMOL/L — SIGNIFICANT CHANGE UP (ref 3.5–5.1)

## 2025-04-07 PROCEDURE — 76999F: CUSTOM | Mod: 26

## 2025-04-07 PROCEDURE — 55873 CRYOABLATE PROSTATE: CPT

## 2025-04-07 DEVICE — GUIDEWIRE AMPLATZ SUPER-STIFF .038" X 145CM 3.5CM FLEXIBLE: Type: IMPLANTABLE DEVICE | Status: FUNCTIONAL

## 2025-04-07 DEVICE — KIT VL PROSTATE 3 ICEPEARL 2.1 CX - FOR LIJ ONLY: Type: IMPLANTABLE DEVICE | Status: FUNCTIONAL

## 2025-04-07 RX ORDER — CIPROFLOXACIN HCL 250 MG
1 TABLET ORAL
Qty: 6 | Refills: 0
Start: 2025-04-07 | End: 2025-04-09

## 2025-04-07 NOTE — ASU DISCHARGE PLAN (ADULT/PEDIATRIC) - CARE PROVIDER_API CALL
Rigoberto Fernandes)  Urology  [address] [place] [address], [address] [address]  Phone: [phone]  Fax: [phone]  Follow Up Time:

## 2025-04-07 NOTE — ASU DISCHARGE PLAN (ADULT/PEDIATRIC) - ASU DC SPECIAL INSTRUCTIONSFT
CATHETER: You will have a catheter, the nurses will review instructions and care before you go home. It is not uncommon to see blood in the urine immediately after the procedure. You will remove the catheter yourself, tomorrow morning, at home.   PAIN CONTROL: You may take 650 mg of Tylenol every 4-6 hours. Do not exceed 4 grams of Tylenol daily.  WOUND Care: It is expected to see blood in the stool or urine for a few days after the procedure. It is expected to see blood in semen for a few weeks after the procedure.   ANTIBIOTICS: You have been sent a prescription for 3 days of the antibiotic ciprofloxacin to your pharmacy. Please take as instructed.   BATHING: Resume showering as usual  ACTIVITY: No heavy lifting or straining. Otherwise, you may return to your usual level of physical activity.  DIET: Return to your usual diet.  NOTIFY YOUR SURGEON IF: You have any bleeding that does not stop, any pus draining from your wound, any fever (over 100.4 F) or chills, persistent nausea/vomiting, persistent diarrhea, or if your pain is not controlled on your discharge pain medications.  FOLLOW-UP:  1. Please call Dr. Fernaneds's office to make a follow-up appointment.   2. Please follow up with your primary care physician in 1-2 weeks

## 2025-04-07 NOTE — ASU DISCHARGE PLAN (ADULT/PEDIATRIC) - NURSING INSTRUCTIONS
You were given IV Tylenol (1000mg) for pain management in the Operating Room.  Please do NOT take any additonal tylenol/acetaminophen products (Percocet, Vicodin, Excedrin) for the next 6-8 hours (after 4:00PM). Please do not take tylenol AND percocet/vicodin/excedrin as narcotic prescription already contains tylenol.    You were given IV antibiotics in the OR. IF you are prescribed oral antibiotics to take at home. Please follow directions on the bottle and start your first dose of antibiotics before bedtime, and schedule your own timing until you finish all antibiotics. DO NOT DISCONTINUE ON YOUR OWN.

## 2025-04-07 NOTE — ASU DISCHARGE PLAN (ADULT/PEDIATRIC) - FINANCIAL ASSISTANCE
[facility] provides services at a reduced cost to those who are determined to be eligible through [facility]’s financial assistance program. Information regarding [facility]’s financial assistance program can be found by going to [email or web address] or by calling [phone].

## 2025-04-07 NOTE — ASU PREOPERATIVE ASSESSMENT, ADULT ([place] ONLY) - FALL HARM RISK - UNIVERSAL INTERVENTIONS
Bed in lowest position, wheels locked, appropriate side rails in place/Call bell, personal items and telephone in reach/Instruct patient to call for assistance before getting out of bed or chair/Non-slip footwear when patient is out of bed/[place] to call system/Physically safe environment - no spills, clutter or unnecessary equipment/Purposeful Proactive Rounding/Room/bathroom lighting operational, light cord in reach

## 2025-04-08 ENCOUNTER — NON-APPOINTMENT (OUTPATIENT)
Age: 70
End: 2025-04-08

## 2025-04-21 ENCOUNTER — NON-APPOINTMENT (OUTPATIENT)
Age: 70
End: 2025-04-21

## 2025-04-22 ENCOUNTER — NON-APPOINTMENT (OUTPATIENT)
Age: 70
End: 2025-04-22

## 2025-04-23 LAB
APPEARANCE: CLEAR
BACTERIA: NEGATIVE /HPF
BILIRUBIN URINE: NEGATIVE
BLOOD URINE: ABNORMAL
CAST: 0 /LPF
COLOR: YELLOW
EPITHELIAL CELLS: 0 /HPF
GLUCOSE QUALITATIVE U: NEGATIVE MG/DL
KETONES URINE: NEGATIVE MG/DL
LEUKOCYTE ESTERASE URINE: NEGATIVE
MICROSCOPIC-UA: NORMAL
NITRITE URINE: NEGATIVE
PH URINE: 6
PROTEIN URINE: 30 MG/DL
RED BLOOD CELLS URINE: 64 /HPF
SPECIFIC GRAVITY URINE: 1.01
UROBILINOGEN URINE: 0.2 MG/DL
WHITE BLOOD CELLS URINE: 1 /HPF

## 2025-04-24 ENCOUNTER — APPOINTMENT (OUTPATIENT)
Dept: UROLOGY | Facility: CLINIC | Age: 70
End: 2025-04-24

## 2025-04-24 LAB — BACTERIA UR CULT: NORMAL

## 2025-06-05 ENCOUNTER — NON-APPOINTMENT (OUTPATIENT)
Age: 70
End: 2025-06-05

## 2025-06-05 ENCOUNTER — APPOINTMENT (OUTPATIENT)
Dept: UROLOGY | Facility: CLINIC | Age: 70
End: 2025-06-05
Payer: COMMERCIAL

## 2025-06-05 VITALS
HEIGHT: 67 IN | HEART RATE: 78 BPM | OXYGEN SATURATION: 97 % | DIASTOLIC BLOOD PRESSURE: 72 MMHG | BODY MASS INDEX: 25.43 KG/M2 | WEIGHT: 162 LBS | SYSTOLIC BLOOD PRESSURE: 108 MMHG

## 2025-06-05 DIAGNOSIS — C61 MALIGNANT NEOPLASM OF PROSTATE: ICD-10-CM

## 2025-06-05 PROCEDURE — 99024 POSTOP FOLLOW-UP VISIT: CPT

## 2025-06-08 LAB
PSA FREE FLD-MCNC: 19 %
PSA FREE SERPL-MCNC: 0.57 NG/ML
PSA SERPL-MCNC: 3.02 NG/ML

## 2025-07-18 ENCOUNTER — APPOINTMENT (OUTPATIENT)
Dept: INTERNAL MEDICINE | Facility: CLINIC | Age: 70
End: 2025-07-18
Payer: COMMERCIAL

## 2025-07-18 VITALS
DIASTOLIC BLOOD PRESSURE: 76 MMHG | WEIGHT: 167 LBS | SYSTOLIC BLOOD PRESSURE: 124 MMHG | HEIGHT: 67 IN | BODY MASS INDEX: 26.21 KG/M2

## 2025-07-18 PROBLEM — E78.5 HYPERLIPIDEMIA: Status: ACTIVE | Noted: 2025-07-18

## 2025-07-18 PROCEDURE — 36415 COLL VENOUS BLD VENIPUNCTURE: CPT

## 2025-07-18 PROCEDURE — 99397 PER PM REEVAL EST PAT 65+ YR: CPT

## 2025-07-18 RX ORDER — ATORVASTATIN CALCIUM 10 MG/1
10 TABLET, FILM COATED ORAL
Qty: 90 | Refills: 0 | Status: ACTIVE | COMMUNITY
Start: 2025-07-18

## 2025-07-19 LAB
ALBUMIN SERPL ELPH-MCNC: 4.5 G/DL
ALP BLD-CCNC: 59 U/L
ALT SERPL-CCNC: 34 U/L
ANION GAP SERPL CALC-SCNC: 13 MMOL/L
AST SERPL-CCNC: 30 U/L
BASOPHILS # BLD AUTO: 0.07 K/UL
BASOPHILS NFR BLD AUTO: 1.1 %
BILIRUB SERPL-MCNC: 0.6 MG/DL
BUN SERPL-MCNC: 13 MG/DL
CALCIUM SERPL-MCNC: 10 MG/DL
CHLORIDE SERPL-SCNC: 103 MMOL/L
CHOLEST SERPL-MCNC: 156 MG/DL
CO2 SERPL-SCNC: 24 MMOL/L
CREAT SERPL-MCNC: 0.97 MG/DL
EGFRCR SERPLBLD CKD-EPI 2021: 85 ML/MIN/1.73M2
EOSINOPHIL # BLD AUTO: 0.19 K/UL
EOSINOPHIL NFR BLD AUTO: 3.1 %
GLUCOSE SERPL-MCNC: 105 MG/DL
HCT VFR BLD CALC: 44.9 %
HDLC SERPL-MCNC: 56 MG/DL
HGB BLD-MCNC: 14 G/DL
IMM GRANULOCYTES NFR BLD AUTO: 0.2 %
LDLC SERPL-MCNC: 89 MG/DL
LYMPHOCYTES # BLD AUTO: 1.79 K/UL
LYMPHOCYTES NFR BLD AUTO: 29.2 %
MAN DIFF?: NORMAL
MCHC RBC-ENTMCNC: 29 PG
MCHC RBC-ENTMCNC: 31.2 G/DL
MCV RBC AUTO: 93 FL
MONOCYTES # BLD AUTO: 0.9 K/UL
MONOCYTES NFR BLD AUTO: 14.7 %
NEUTROPHILS # BLD AUTO: 3.18 K/UL
NEUTROPHILS NFR BLD AUTO: 51.7 %
NONHDLC SERPL-MCNC: 100 MG/DL
PLATELET # BLD AUTO: 166 K/UL
POTASSIUM SERPL-SCNC: 4.2 MMOL/L
PROT SERPL-MCNC: 6.7 G/DL
RBC # BLD: 4.83 M/UL
RBC # FLD: 13.5 %
SODIUM SERPL-SCNC: 141 MMOL/L
TRIGL SERPL-MCNC: 52 MG/DL
TSH SERPL-ACNC: 1.03 UIU/ML
WBC # FLD AUTO: 6.14 K/UL

## 2025-07-21 RX ORDER — ATORVASTATIN CALCIUM 20 MG/1
20 TABLET, FILM COATED ORAL DAILY
Qty: 90 | Refills: 1 | Status: ACTIVE | COMMUNITY
Start: 2025-07-21 | End: 1900-01-01

## 2025-08-12 ENCOUNTER — RX RENEWAL (OUTPATIENT)
Age: 70
End: 2025-08-12

## (undated) DEVICE — GRID BRACHYTHERAPY EZ 18G

## (undated) DEVICE — WARMING BLANKET UPPER ADULT

## (undated) DEVICE — SYR LUER LOK 10CC

## (undated) DEVICE — DRAINAGE BAG URINARY 2L

## (undated) DEVICE — DRSG TELFA 3 X 8

## (undated) DEVICE — NEOGUARD ENDOCAVITY PROBE COVER 1 X 11.8"

## (undated) DEVICE — VENODYNE/SCD SLEEVE CALF MEDIUM

## (undated) DEVICE — PREP CHLORAPREP HI-LITE ORANGE 3ML

## (undated) DEVICE — FOLEY HOLDER STATLOCK 2 WAY ADULT

## (undated) DEVICE — GRID BRACHYTHERAPY EZ 14G

## (undated) DEVICE — NDL SPINAL 20G X 6" QUINCKE

## (undated) DEVICE — Device

## (undated) DEVICE — GLV 7.5 PROTEXIS (WHITE)

## (undated) DEVICE — SYR TOOMEY 50ML

## (undated) DEVICE — DRAPE BACK TABLE COVER 44X90"

## (undated) DEVICE — CATH ANGIO 14G X 3.25"

## (undated) DEVICE — BASIN SET DOUBLE

## (undated) DEVICE — TUBING IV EXTENSION MICROBORE W MICROCLAVE 7"

## (undated) DEVICE — TUBING TUR 2 PRONG

## (undated) DEVICE — NDL MAX CORE 18G X 25CM

## (undated) DEVICE — GOWN XXXL

## (undated) DEVICE — SYR LUER LOK 20CC

## (undated) DEVICE — VENODYNE/SCD SLEEVE CALF LARGE

## (undated) DEVICE — SYR LUER LOK 50CC

## (undated) DEVICE — FOLEY CATH 2-WAY 16FR 5CC LATEX LUBRICATH

## (undated) DEVICE — POSITIONER FOAM EGG CRATE ULNAR 2PCS (PINK)

## (undated) DEVICE — BALLOON ENDOCAVITY 2X14CM